# Patient Record
Sex: FEMALE | Race: OTHER | Employment: UNEMPLOYED | URBAN - METROPOLITAN AREA
[De-identification: names, ages, dates, MRNs, and addresses within clinical notes are randomized per-mention and may not be internally consistent; named-entity substitution may affect disease eponyms.]

---

## 2019-07-02 ENCOUNTER — HOSPITAL ENCOUNTER (EMERGENCY)
Age: 19
Discharge: HOME | End: 2019-07-02
Payer: COMMERCIAL

## 2019-07-02 VITALS
WEIGHT: 143.74 LBS | TEMPERATURE: 98.6 F | BODY MASS INDEX: 25.47 KG/M2 | RESPIRATION RATE: 18 BRPM | DIASTOLIC BLOOD PRESSURE: 67 MMHG | HEART RATE: 68 BPM | OXYGEN SATURATION: 98 % | HEIGHT: 63 IN | SYSTOLIC BLOOD PRESSURE: 102 MMHG

## 2019-07-02 DIAGNOSIS — G43.909 MIGRAINE WITHOUT STATUS MIGRAINOSUS, NOT INTRACTABLE, UNSPECIFIED MIGRAINE TYPE: Primary | ICD-10-CM

## 2019-07-02 PROCEDURE — 2580000003 HC RX 258: Performed by: PHYSICIAN ASSISTANT

## 2019-07-02 PROCEDURE — 96361 HYDRATE IV INFUSION ADD-ON: CPT

## 2019-07-02 PROCEDURE — 96375 TX/PRO/DX INJ NEW DRUG ADDON: CPT

## 2019-07-02 PROCEDURE — 99283 EMERGENCY DEPT VISIT LOW MDM: CPT

## 2019-07-02 PROCEDURE — 6360000002 HC RX W HCPCS: Performed by: PHYSICIAN ASSISTANT

## 2019-07-02 PROCEDURE — 96374 THER/PROPH/DIAG INJ IV PUSH: CPT

## 2019-07-02 RX ORDER — 0.9 % SODIUM CHLORIDE 0.9 %
1000 INTRAVENOUS SOLUTION INTRAVENOUS ONCE
Status: COMPLETED | OUTPATIENT
Start: 2019-07-02 | End: 2019-07-02

## 2019-07-02 RX ORDER — KETOROLAC TROMETHAMINE 30 MG/ML
30 INJECTION, SOLUTION INTRAMUSCULAR; INTRAVENOUS ONCE
Status: COMPLETED | OUTPATIENT
Start: 2019-07-02 | End: 2019-07-02

## 2019-07-02 RX ORDER — METOCLOPRAMIDE HYDROCHLORIDE 5 MG/ML
10 INJECTION INTRAMUSCULAR; INTRAVENOUS ONCE
Status: COMPLETED | OUTPATIENT
Start: 2019-07-02 | End: 2019-07-02

## 2019-07-02 RX ORDER — DIPHENHYDRAMINE HYDROCHLORIDE 50 MG/ML
12.5 INJECTION INTRAMUSCULAR; INTRAVENOUS ONCE
Status: COMPLETED | OUTPATIENT
Start: 2019-07-02 | End: 2019-07-02

## 2019-07-02 RX ADMIN — KETOROLAC TROMETHAMINE 30 MG: 30 INJECTION, SOLUTION INTRAMUSCULAR at 16:38

## 2019-07-02 RX ADMIN — METOCLOPRAMIDE 10 MG: 5 INJECTION, SOLUTION INTRAMUSCULAR; INTRAVENOUS at 16:38

## 2019-07-02 RX ADMIN — SODIUM CHLORIDE 1000 ML: 9 INJECTION, SOLUTION INTRAVENOUS at 16:38

## 2019-07-02 RX ADMIN — DIPHENHYDRAMINE HYDROCHLORIDE 12.5 MG: 50 INJECTION, SOLUTION INTRAMUSCULAR; INTRAVENOUS at 16:38

## 2019-07-02 ASSESSMENT — PAIN DESCRIPTION - LOCATION: LOCATION: HEAD

## 2019-07-02 ASSESSMENT — PAIN SCALES - GENERAL
PAINLEVEL_OUTOF10: 7
PAINLEVEL_OUTOF10: 7

## 2019-07-02 ASSESSMENT — PAIN DESCRIPTION - PAIN TYPE: TYPE: ACUTE PAIN

## 2019-07-02 ASSESSMENT — PAIN DESCRIPTION - PROGRESSION: CLINICAL_PROGRESSION: GRADUALLY WORSENING

## 2019-07-02 ASSESSMENT — PAIN DESCRIPTION - FREQUENCY: FREQUENCY: CONTINUOUS

## 2019-07-02 ASSESSMENT — ENCOUNTER SYMPTOMS
CHEST TIGHTNESS: 0
SHORTNESS OF BREATH: 0
COLOR CHANGE: 0
VOMITING: 0
NAUSEA: 0

## 2019-07-02 ASSESSMENT — PAIN DESCRIPTION - ONSET: ONSET: GRADUAL

## 2019-07-02 ASSESSMENT — PAIN DESCRIPTION - DESCRIPTORS: DESCRIPTORS: THROBBING

## 2019-07-02 NOTE — ED PROVIDER NOTES
and myalgias. Skin: Negative for color change, pallor and rash. Neurological: Positive for headaches. Negative for dizziness, weakness and light-headedness. Psychiatric/Behavioral: Negative for behavioral problems and confusion. Except as noted above the remainder of the review of systems was reviewed and negative. PAST MEDICAL HISTORY   History reviewed. No pertinent past medical history. SURGICAL HISTORY     History reviewed. No pertinent surgical history. CURRENT MEDICATIONS     [unfilled]    ALLERGIES     Ritalin [methylphenidate] and Oxycodone    FAMILY HISTORY     History reviewed. No pertinent family history. No family status information on file. SOCIAL HISTORY      reports that she has never smoked. She has never used smokeless tobacco. She reports that she does not drink alcohol or use drugs. PHYSICAL EXAM    (up to 7 for level 4, 8 or more for level 5)     ED Triage Vitals [07/02/19 1607]   Enc Vitals Group      /67      Pulse 68      Resp 18      Temp 98.6 °F (37 °C)      Temp Source Oral      SpO2 98 %      Weight 143 lb 11.8 oz (65.2 kg)      Height 5' 3\" (1.6 m)      Head Circumference       Peak Flow       Pain Score       Pain Loc       Pain Edu? Excl. in [address]? Physical Exam   Constitutional: She is oriented to person, place, and time. She appears well-developed and well-nourished. HENT:   Head: Normocephalic and atraumatic. Eyes: Conjunctivae and EOM are normal.   Neck: Normal range of motion. Neck supple. Pulmonary/Chest: Effort normal. No respiratory distress. Musculoskeletal: Normal range of motion. Neurological: She is alert and oriented to person, place, and time. No cranial nerve deficit. Coordination (Normal finger-to-nose) normal.   Sensation intact throughout   Skin: Skin is warm. She is not diaphoretic. No erythema. Psychiatric: She has a normal mood and affect.  Her behavior is normal.         DIAGNOSTIC RESULTS     EKG: Return to ED if acute worsening, change in headache or other concerning symptoms. Patient comfortable with plan. The patient tolerated their visit well. I saw the patient independently with physician available for consultation as needed. I have discussed the findings of today's workup with the patient and addressed the patient's questions and concerns. Important warning signs as well as new or worsening symptoms which would necessitate immediate return to the ED were discussed. The plan is to discharge from the ED at this time, and the patient is in stable condition. The patient acknowledged understanding is agreeable with this plan. CONSULTS:  None    PROCEDURES:  Procedures    FINAL IMPRESSION      1. Migraine without status migrainosus, not intractable, unspecified migraine type          DISPOSITION/PLAN   [unfilled]    PATIENT REFERRED TO:  [place] [address]  [phone]  Go to   If symptoms worsen    [facility] [address] [place] [address] [phone]    Schedule an appointment as soon as possible for a visit in 2 days  For follow up and reevaluation.       DISCHARGE MEDICATIONS:  New Prescriptions    No medications on file       (Please note that portions of this note were completed with a voice recognition program.  Efforts were made to edit the dictations but occasionally words are mis-transcribed.)    [address] [facility]          [address] [place], [place]  07/02/19 6009

## 2019-08-11 ENCOUNTER — APPOINTMENT (OUTPATIENT)
Dept: GENERAL RADIOLOGY | Age: 19
End: 2019-08-11
Payer: COMMERCIAL

## 2019-08-11 ENCOUNTER — HOSPITAL ENCOUNTER (EMERGENCY)
Age: 19
Discharge: LEFT BEFORE DISCHARGE | End: 2019-08-11
Attending: EMERGENCY MEDICINE
Payer: COMMERCIAL

## 2019-08-11 ENCOUNTER — APPOINTMENT (OUTPATIENT)
Dept: CT IMAGING | Age: 19
End: 2019-08-11
Payer: COMMERCIAL

## 2019-08-11 VITALS
RESPIRATION RATE: 15 BRPM | TEMPERATURE: 98.4 F | WEIGHT: 144.7 LBS | HEIGHT: 63 IN | DIASTOLIC BLOOD PRESSURE: 66 MMHG | BODY MASS INDEX: 25.64 KG/M2 | OXYGEN SATURATION: 98 % | HEART RATE: 74 BPM | SYSTOLIC BLOOD PRESSURE: 113 MMHG

## 2019-08-11 DIAGNOSIS — Z53.29 LEFT AGAINST MEDICAL ADVICE: Primary | ICD-10-CM

## 2019-08-11 LAB
A/G RATIO: 1.6 (ref 1.1–2.2)
ALBUMIN SERPL-MCNC: 4.5 G/DL (ref 3.4–5)
ALP BLD-CCNC: 93 U/L (ref 40–129)
ALT SERPL-CCNC: 13 U/L (ref 10–40)
ANION GAP SERPL CALCULATED.3IONS-SCNC: 12 MMOL/L (ref 3–16)
AST SERPL-CCNC: 13 U/L (ref 15–37)
BASOPHILS ABSOLUTE: 0.1 K/UL (ref 0–0.2)
BASOPHILS RELATIVE PERCENT: 1 %
BILIRUB SERPL-MCNC: 0.3 MG/DL (ref 0–1)
BUN BLDV-MCNC: 15 MG/DL (ref 7–20)
CALCIUM SERPL-MCNC: 10.3 MG/DL (ref 8.3–10.6)
CHLORIDE BLD-SCNC: 106 MMOL/L (ref 99–110)
CO2: 24 MMOL/L (ref 21–32)
CREAT SERPL-MCNC: 0.8 MG/DL (ref 0.6–1.1)
EOSINOPHILS ABSOLUTE: 0.2 K/UL (ref 0–0.6)
EOSINOPHILS RELATIVE PERCENT: 2.4 %
GFR AFRICAN AMERICAN: >60
GFR NON-AFRICAN AMERICAN: >60
GLOBULIN: 2.8 G/DL
GLUCOSE BLD-MCNC: 110 MG/DL (ref 70–99)
GONADOTROPIN, CHORIONIC (HCG) QUANT: <5 MIU/ML
HCT VFR BLD CALC: 40.3 % (ref 36–48)
HEMOGLOBIN: 13.4 G/DL (ref 12–16)
LYMPHOCYTES ABSOLUTE: 2 K/UL (ref 1–5.1)
LYMPHOCYTES RELATIVE PERCENT: 26.8 %
MCH RBC QN AUTO: 31.2 PG (ref 26–34)
MCHC RBC AUTO-ENTMCNC: 33.3 G/DL (ref 31–36)
MCV RBC AUTO: 93.8 FL (ref 80–100)
MONOCYTES ABSOLUTE: 0.5 K/UL (ref 0–1.3)
MONOCYTES RELATIVE PERCENT: 7.5 %
NEUTROPHILS ABSOLUTE: 4.6 K/UL (ref 1.7–7.7)
NEUTROPHILS RELATIVE PERCENT: 62.3 %
PDW BLD-RTO: 13.2 % (ref 12.4–15.4)
PLATELET # BLD: 252 K/UL (ref 135–450)
PMV BLD AUTO: 8.3 FL (ref 5–10.5)
POTASSIUM SERPL-SCNC: 3.9 MMOL/L (ref 3.5–5.1)
RBC # BLD: 4.3 M/UL (ref 4–5.2)
SODIUM BLD-SCNC: 142 MMOL/L (ref 136–145)
TOTAL PROTEIN: 7.3 G/DL (ref 6.4–8.2)
TROPONIN: <0.01 NG/ML
WBC # BLD: 7.3 K/UL (ref 4–11)

## 2019-08-11 PROCEDURE — 36415 COLL VENOUS BLD VENIPUNCTURE: CPT

## 2019-08-11 PROCEDURE — 84702 CHORIONIC GONADOTROPIN TEST: CPT

## 2019-08-11 PROCEDURE — 70450 CT HEAD/BRAIN W/O DYE: CPT

## 2019-08-11 PROCEDURE — 71046 X-RAY EXAM CHEST 2 VIEWS: CPT

## 2019-08-11 PROCEDURE — 85025 COMPLETE CBC W/AUTO DIFF WBC: CPT

## 2019-08-11 PROCEDURE — 93005 ELECTROCARDIOGRAM TRACING: CPT | Performed by: EMERGENCY MEDICINE

## 2019-08-11 PROCEDURE — 84484 ASSAY OF TROPONIN QUANT: CPT

## 2019-08-11 PROCEDURE — 6370000000 HC RX 637 (ALT 250 FOR IP): Performed by: EMERGENCY MEDICINE

## 2019-08-11 PROCEDURE — 99284 EMERGENCY DEPT VISIT MOD MDM: CPT

## 2019-08-11 PROCEDURE — 80053 COMPREHEN METABOLIC PANEL: CPT

## 2019-08-11 RX ORDER — BUTALBITAL, ACETAMINOPHEN AND CAFFEINE 50; 325; 40 MG/1; MG/1; MG/1
1 TABLET ORAL ONCE
Status: COMPLETED | OUTPATIENT
Start: 2019-08-11 | End: 2019-08-11

## 2019-08-11 RX ADMIN — BUTALBITAL, ACETAMINOPHEN, AND CAFFEINE 1 TABLET: 50; 325; 40 TABLET ORAL at 21:27

## 2019-08-11 ASSESSMENT — PAIN DESCRIPTION - PROGRESSION: CLINICAL_PROGRESSION: NOT CHANGED

## 2019-08-11 ASSESSMENT — PAIN SCALES - GENERAL
PAINLEVEL_OUTOF10: 7
PAINLEVEL_OUTOF10: 4
PAINLEVEL_OUTOF10: 7

## 2019-08-11 ASSESSMENT — PAIN DESCRIPTION - PAIN TYPE: TYPE: ACUTE PAIN

## 2019-08-11 ASSESSMENT — PAIN DESCRIPTION - LOCATION
LOCATION: HEAD
LOCATION: HEAD

## 2019-08-11 ASSESSMENT — PAIN DESCRIPTION - DESCRIPTORS
DESCRIPTORS: PRESSURE
DESCRIPTORS: ACHING

## 2019-08-11 ASSESSMENT — PAIN DESCRIPTION - ONSET: ONSET: ON-GOING

## 2019-08-11 ASSESSMENT — PAIN DESCRIPTION - FREQUENCY: FREQUENCY: CONTINUOUS

## 2019-08-11 ASSESSMENT — PAIN DESCRIPTION - ORIENTATION: ORIENTATION: MID

## 2019-08-12 LAB
EKG ATRIAL RATE: 82 BPM
EKG DIAGNOSIS: NORMAL
EKG P AXIS: 50 DEGREES
EKG P-R INTERVAL: 142 MS
EKG Q-T INTERVAL: 340 MS
EKG QRS DURATION: 100 MS
EKG QTC CALCULATION (BAZETT): 397 MS
EKG R AXIS: -3 DEGREES
EKG T AXIS: 27 DEGREES
EKG VENTRICULAR RATE: 82 BPM

## 2019-08-12 PROCEDURE — 93010 ELECTROCARDIOGRAM REPORT: CPT | Performed by: INTERNAL MEDICINE

## 2020-01-05 ENCOUNTER — HOSPITAL ENCOUNTER (EMERGENCY)
Age: 20
Discharge: HOME | End: 2020-01-05
Attending: EMERGENCY MEDICINE
Payer: COMMERCIAL

## 2020-01-05 VITALS
DIASTOLIC BLOOD PRESSURE: 71 MMHG | SYSTOLIC BLOOD PRESSURE: 115 MMHG | OXYGEN SATURATION: 99 % | WEIGHT: 132.94 LBS | TEMPERATURE: 97 F | RESPIRATION RATE: 17 BRPM | BODY MASS INDEX: 23.55 KG/M2 | HEART RATE: 65 BPM

## 2020-01-05 LAB
BACTERIA WET PREP: ABNORMAL
BACTERIA: ABNORMAL /HPF
BILIRUBIN URINE: NEGATIVE
BLOOD, URINE: ABNORMAL
CLARITY: ABNORMAL
CLUE CELLS: ABNORMAL
COLOR: YELLOW
EPITHELIAL CELLS WET PREP: ABNORMAL
EPITHELIAL CELLS, UA: 4 /HPF (ref 0–5)
GLUCOSE URINE: NEGATIVE MG/DL
HCG(URINE) PREGNANCY TEST: NEGATIVE
HYALINE CASTS: 11 /LPF (ref 0–8)
KETONES, URINE: NEGATIVE MG/DL
LEUKOCYTE ESTERASE, URINE: ABNORMAL
MICROSCOPIC EXAMINATION: YES
NITRITE, URINE: POSITIVE
PH UA: 7 (ref 5–8)
PROTEIN UA: ABNORMAL MG/DL
RBC UA: 18 /HPF (ref 0–4)
RBC WET PREP: ABNORMAL
SOURCE WET PREP: ABNORMAL
SPECIFIC GRAVITY UA: 1.02 (ref 1–1.03)
TRICHOMONAS PREP: ABNORMAL
URINE REFLEX TO CULTURE: YES
URINE TYPE: ABNORMAL
UROBILINOGEN, URINE: 0.2 E.U./DL
WBC UA: 109 /HPF (ref 0–5)
WBC WET PREP: ABNORMAL
YEAST WET PREP: ABNORMAL

## 2020-01-05 PROCEDURE — 87253 VIRUS INOCULATE TISSUE ADDL: CPT

## 2020-01-05 PROCEDURE — 87210 SMEAR WET MOUNT SALINE/INK: CPT

## 2020-01-05 PROCEDURE — 87252 VIRUS INOCULATION TISSUE: CPT

## 2020-01-05 PROCEDURE — 87077 CULTURE AEROBIC IDENTIFY: CPT

## 2020-01-05 PROCEDURE — 84703 CHORIONIC GONADOTROPIN ASSAY: CPT

## 2020-01-05 PROCEDURE — 87186 SC STD MICRODIL/AGAR DIL: CPT

## 2020-01-05 PROCEDURE — 87086 URINE CULTURE/COLONY COUNT: CPT

## 2020-01-05 PROCEDURE — 87591 N.GONORRHOEAE DNA AMP PROB: CPT

## 2020-01-05 PROCEDURE — 81001 URINALYSIS AUTO W/SCOPE: CPT

## 2020-01-05 PROCEDURE — 99283 EMERGENCY DEPT VISIT LOW MDM: CPT

## 2020-01-05 PROCEDURE — 87491 CHLMYD TRACH DNA AMP PROBE: CPT

## 2020-01-05 RX ORDER — METRONIDAZOLE 500 MG/1
500 TABLET ORAL 2 TIMES DAILY
Qty: 14 TABLET | Refills: 0 | Status: SHIPPED | OUTPATIENT
Start: 2020-01-05 | End: 2020-01-12

## 2020-01-05 RX ORDER — NITROFURANTOIN 25; 75 MG/1; MG/1
100 CAPSULE ORAL 2 TIMES DAILY
Qty: 14 CAPSULE | Refills: 0 | Status: SHIPPED | OUTPATIENT
Start: 2020-01-05 | End: 2020-01-12

## 2020-01-05 ASSESSMENT — ENCOUNTER SYMPTOMS
RESPIRATORY NEGATIVE: 1
GASTROINTESTINAL NEGATIVE: 1

## 2020-01-05 NOTE — ED NOTES
AVS reviewed with pt who verbalized understanding of f/u care and d/c instructions.  Pt stable for d/c     Honey Seaman RN  01/05/20 0992

## 2020-01-05 NOTE — ED TRIAGE NOTES
pt presents to ED for concern of STD or yeast infection. states she has had white discharge took an OTC medication.  also concerned for \"white dot\" in [place] area

## 2020-01-05 NOTE — ED PROVIDER NOTES
[facility]  eMERGENCY dEPARTMENT eNCOUnter    Pt Name: @@  MRN: [id number]  Birthdate2000  Date of evaluation: 1/5/2020  Provider: MACO Gorman CNP     eValuated by the advanced practice provider    CHIEF COMPLAINT       Chief Complaint   Patient presents with    Exposure to STD     pt presents to ED for concern of STD or yeast infection. states she has had white discharge took an OTC medication. also concerned for \"white dot\" in [place] area         HISTORY OF PRESENT ILLNESS  (Location/Symptom, Timing/Onset, Context/Setting, Quality, Duration, Modifying Factors, Severity.)   Nate Bhatti is a 22 y.o. female who presents to the emergencydepartment concerned about a sexually transmitted infection, noted a white vaginal discharge earlier this week. Treated herself with miconazole vaginal cream.  Discharge resolved. However she was concerned about 2 or 3 red canker sore looking bumps on her right labia. She states she treated it with with tea tree oil and it seems to be improving. This is the first time she is ever noticed that and her vaginal area. Nursing Notes were reviewed and I agree. REVIEW OF SYSTEMS    (2-9 systems for level 4, 10 or more for level 5)     Review of Systems   Constitutional: Negative. HENT: Negative. Respiratory: Negative. Cardiovascular: Negative. Gastrointestinal: Negative. Genitourinary: Positive for vaginal discharge. Negative for difficulty urinating, dyspareunia and dysuria. Musculoskeletal: Negative. Except as noted above the remainder of the review of systems was reviewed and negative. PAST MEDICAL HISTORY   History reviewed. No pertinent past medical history. SURGICAL HISTORY     History reviewed. No pertinent surgical history.     CURRENT MEDICATIONS       Discharge Medication List as of 1/5/2020  2:00 PM          ALLERGIES     Ritalin [methylphenidate] and Oxycodone    FAMILY HISTORY     History reviewed. No pertinent family history. No family status information on file. SOCIAL HISTORY      reports that she has never smoked. She has never used smokeless tobacco. She reports current drug use. Drug: Marijuana. She reports that she does not drink alcohol. PHYSICAL EXAM    (up to 7 for level 4, 8 or more for level 5)      ED Triage Vitals [01/05/20 1207]   BP Temp Temp Source Pulse Resp SpO2 Height Weight   109/72 97 °F (36.1 °C) Temporal 75 17 97 % -- 132 lb 15 oz (60.3 kg)       Physical Exam  Vitals signs and nursing note reviewed. Exam conducted with a chaperone present [facility] tech present). Cardiovascular:      Rate and Rhythm: Normal rate. Pulses: Normal pulses. Pulmonary:      Effort: Pulmonary effort is normal.   Abdominal:      Hernia: There is no hernia in the right inguinal area or left inguinal area. Genitourinary:     Exam position: Lithotomy position. Pubic Area: No pubic lice. Labia:         Right: Lesion present. Left: No lesion. Vagina: Vaginal discharge present. No tenderness. Cervix: No discharge. Adnexa: Right adnexa normal and left adnexa normal.          Comments: Yellowish vaginal discharge in the vaginal vault. Lymphadenopathy:      Lower Body: No right inguinal adenopathy. No left inguinal adenopathy. Skin:     General: Skin is warm and dry. Capillary Refill: Capillary refill takes less than 2 seconds. Neurological:      General: No focal deficit present. Mental Status: She is alert.            DIAGNOSTIC RESULTS     RADIOLOGY:   Non-plain film images such as CT, Ultrasound and MRI are read by the radiologist. Plain radiographic images are visualized and preliminarily interpreted by MACO Garcia CNP with the below findings:        Interpretation per the Radiologist below, if available at the time of this note:    No orders to display       LABS:  Labs Reviewed   WET PREP, above.    As positive for nitrites moderate leuk esterase. Pregnancy is negative. Microscopic urine: 4+ bacteria 109 white blood cells. Wet prep: Negative trichomonas negative yeast, positive clue cells  Gonorrhea and Chlamydia culture in process  Genital herpes culture in process    Patient is instructed [facility], abstain from any sexual relations, should be treated with Flagyl for BV and Macrobid for the UTI. I told the patient that if her GC cultures are positive or her herpes culture is positive she will be called and notified to return for treatment. Patient verbalized understanding and she is discharged home in good condition. This dictation was performed with a verbal recognition program (DRAGON) and it was checked for errors. It is possible that there are still dictated errors within this office note. If so, please bring any errors to my attention for an addendum. All efforts were made to ensure that this office note is accurate. PROCEDURES:  Procedures    FINAL IMPRESSION      1. Urinary tract infection without hematuria, site unspecified    2. Bacterial vaginosis          DISPOSITION/PLAN   DISPOSITION Decision To Discharge 01/05/2020 01:49:44 PM      PATIENT REFERRED TO:  No follow-up provider specified.     DISCHARGE MEDICATIONS:  Discharge Medication List as of 1/5/2020  2:00 PM      START taking these medications    Details   metroNIDAZOLE (FLAGYL) 500 MG tablet Take 1 tablet by mouth 2 times daily for 7 days, Disp-14 tablet, R-0Print      nitrofurantoin, macrocrystal-monohydrate, (MACROBID) 100 MG capsule Take 1 capsule by mouth 2 times daily for 7 days, Disp-14 capsule, R-0Print             (Please note that portions of this note werecompleted with a voice recognition program.  Efforts were made to edit the dictations but occasionally words are mis-transcribed.)    Treva Peres, APRN - CNP      Elver Padilla, APRN - CNP  01/05/20 5060

## 2020-01-07 LAB
ORGANISM: ABNORMAL
URINE CULTURE, ROUTINE: ABNORMAL

## 2020-01-08 LAB
C TRACH DNA GENITAL QL NAA+PROBE: NORMAL
FINAL REPORT: NORMAL
N. GONORRHOEAE DNA: NORMAL
PRELIMINARY: NORMAL

## 2020-01-11 NOTE — RESULT ENCOUNTER NOTE
Patient's positive result has been appropriately evaluated by the provider pool. Patient was contacted and notified of the change in treatment plan. Medication was phoned to the patient's pharmacy per protocol:    Pharmacy: No Pharmacies Listed  Phone number: [phone]  Pharmacist receiving the prescription: Gisselle  Spoke with her on the phone  She was very upset by the diagnosis. Given [facility] web site to get further information  Aware no sex until outbreak is gone and then only protected sex. To follow up with [facility].   To inform sex partners

## 2020-05-28 ENCOUNTER — APPOINTMENT (OUTPATIENT)
Dept: CT IMAGING | Age: 20
End: 2020-05-28
Payer: COMMERCIAL

## 2020-05-28 ENCOUNTER — HOSPITAL ENCOUNTER (EMERGENCY)
Age: 20
Discharge: HOME | End: 2020-05-29
Attending: EMERGENCY MEDICINE
Payer: COMMERCIAL

## 2020-05-28 LAB
ANION GAP SERPL CALCULATED.3IONS-SCNC: 9 MMOL/L (ref 3–16)
BACTERIA: ABNORMAL /HPF
BASOPHILS ABSOLUTE: 0.1 K/UL (ref 0–0.2)
BASOPHILS RELATIVE PERCENT: 0.7 %
BILIRUBIN URINE: NEGATIVE
BLOOD, URINE: ABNORMAL
BUN BLDV-MCNC: 13 MG/DL (ref 7–20)
CALCIUM SERPL-MCNC: 10.4 MG/DL (ref 8.3–10.6)
CHLORIDE BLD-SCNC: 109 MMOL/L (ref 99–110)
CLARITY: ABNORMAL
CO2: 24 MMOL/L (ref 21–32)
COLOR: ABNORMAL
CREAT SERPL-MCNC: 0.6 MG/DL (ref 0.6–1.1)
EOSINOPHILS ABSOLUTE: 0.2 K/UL (ref 0–0.6)
EOSINOPHILS RELATIVE PERCENT: 2.1 %
EPITHELIAL CELLS, UA: ABNORMAL /HPF (ref 0–5)
GFR AFRICAN AMERICAN: >60
GFR NON-AFRICAN AMERICAN: >60
GLUCOSE BLD-MCNC: 91 MG/DL (ref 70–99)
GLUCOSE URINE: NEGATIVE MG/DL
HCG(URINE) PREGNANCY TEST: NEGATIVE
HCT VFR BLD CALC: 39.9 % (ref 36–48)
HEMOGLOBIN: 13.5 G/DL (ref 12–16)
KETONES, URINE: NEGATIVE MG/DL
LEUKOCYTE ESTERASE, URINE: ABNORMAL
LYMPHOCYTES ABSOLUTE: 2.7 K/UL (ref 1–5.1)
LYMPHOCYTES RELATIVE PERCENT: 30.8 %
MCH RBC QN AUTO: 32.6 PG (ref 26–34)
MCHC RBC AUTO-ENTMCNC: 33.8 G/DL (ref 31–36)
MCV RBC AUTO: 96.5 FL (ref 80–100)
MICROSCOPIC EXAMINATION: YES
MONOCYTES ABSOLUTE: 0.6 K/UL (ref 0–1.3)
MONOCYTES RELATIVE PERCENT: 7.1 %
NEUTROPHILS ABSOLUTE: 5.3 K/UL (ref 1.7–7.7)
NEUTROPHILS RELATIVE PERCENT: 59.3 %
NITRITE, URINE: NEGATIVE
PDW BLD-RTO: 12.6 % (ref 12.4–15.4)
PH UA: 6 (ref 5–8)
PLATELET # BLD: 254 K/UL (ref 135–450)
PMV BLD AUTO: 8.6 FL (ref 5–10.5)
POTASSIUM REFLEX MAGNESIUM: 4 MMOL/L (ref 3.5–5.1)
PROTEIN UA: 30 MG/DL
RBC # BLD: 4.13 M/UL (ref 4–5.2)
RBC UA: >100 /HPF (ref 0–4)
SODIUM BLD-SCNC: 142 MMOL/L (ref 136–145)
SPECIFIC GRAVITY UA: >=1.03 (ref 1–1.03)
URINE REFLEX TO CULTURE: ABNORMAL
URINE TYPE: ABNORMAL
UROBILINOGEN, URINE: 0.2 E.U./DL
WBC # BLD: 8.9 K/UL (ref 4–11)
WBC UA: ABNORMAL /HPF (ref 0–5)

## 2020-05-28 PROCEDURE — 84703 CHORIONIC GONADOTROPIN ASSAY: CPT

## 2020-05-28 PROCEDURE — 99284 EMERGENCY DEPT VISIT MOD MDM: CPT

## 2020-05-28 PROCEDURE — 6360000002 HC RX W HCPCS: Performed by: EMERGENCY MEDICINE

## 2020-05-28 PROCEDURE — 96374 THER/PROPH/DIAG INJ IV PUSH: CPT

## 2020-05-28 PROCEDURE — 85025 COMPLETE CBC W/AUTO DIFF WBC: CPT

## 2020-05-28 PROCEDURE — 80048 BASIC METABOLIC PNL TOTAL CA: CPT

## 2020-05-28 PROCEDURE — 36415 COLL VENOUS BLD VENIPUNCTURE: CPT

## 2020-05-28 PROCEDURE — 81001 URINALYSIS AUTO W/SCOPE: CPT

## 2020-05-28 PROCEDURE — 74176 CT ABD & PELVIS W/O CONTRAST: CPT

## 2020-05-28 RX ORDER — KETOROLAC TROMETHAMINE 30 MG/ML
30 INJECTION, SOLUTION INTRAMUSCULAR; INTRAVENOUS ONCE
Status: COMPLETED | OUTPATIENT
Start: 2020-05-28 | End: 2020-05-28

## 2020-05-28 RX ADMIN — KETOROLAC TROMETHAMINE 30 MG: 30 INJECTION, SOLUTION INTRAMUSCULAR at 23:15

## 2020-05-28 ASSESSMENT — PAIN SCALES - GENERAL: PAINLEVEL_OUTOF10: 10

## 2020-05-28 ASSESSMENT — PAIN DESCRIPTION - LOCATION: LOCATION: ABDOMEN

## 2020-05-28 ASSESSMENT — PAIN DESCRIPTION - DESCRIPTORS: DESCRIPTORS: STABBING

## 2020-05-28 ASSESSMENT — PAIN DESCRIPTION - FREQUENCY: FREQUENCY: INTERMITTENT

## 2020-05-28 ASSESSMENT — PAIN DESCRIPTION - PAIN TYPE: TYPE: ACUTE PAIN

## 2020-05-28 ASSESSMENT — PAIN DESCRIPTION - ORIENTATION: ORIENTATION: RIGHT

## 2020-05-29 VITALS
WEIGHT: 139.55 LBS | TEMPERATURE: 98.1 F | HEIGHT: 63 IN | OXYGEN SATURATION: 99 % | BODY MASS INDEX: 24.73 KG/M2 | RESPIRATION RATE: 16 BRPM | SYSTOLIC BLOOD PRESSURE: 111 MMHG | HEART RATE: 62 BPM | DIASTOLIC BLOOD PRESSURE: 78 MMHG

## 2020-05-29 VITALS
HEART RATE: 76 BPM | OXYGEN SATURATION: 99 % | HEIGHT: 63 IN | BODY MASS INDEX: 24.41 KG/M2 | RESPIRATION RATE: 14 BRPM | WEIGHT: 137.79 LBS | SYSTOLIC BLOOD PRESSURE: 113 MMHG | TEMPERATURE: 98.4 F | DIASTOLIC BLOOD PRESSURE: 71 MMHG

## 2020-05-29 PROCEDURE — 2580000003 HC RX 258: Performed by: EMERGENCY MEDICINE

## 2020-05-29 PROCEDURE — 96374 THER/PROPH/DIAG INJ IV PUSH: CPT

## 2020-05-29 PROCEDURE — 6360000002 HC RX W HCPCS: Performed by: EMERGENCY MEDICINE

## 2020-05-29 PROCEDURE — 6370000000 HC RX 637 (ALT 250 FOR IP): Performed by: EMERGENCY MEDICINE

## 2020-05-29 PROCEDURE — 99284 EMERGENCY DEPT VISIT MOD MDM: CPT

## 2020-05-29 RX ORDER — ONDANSETRON 2 MG/ML
4 INJECTION INTRAMUSCULAR; INTRAVENOUS ONCE
Status: COMPLETED | OUTPATIENT
Start: 2020-05-29 | End: 2020-05-29

## 2020-05-29 RX ORDER — MORPHINE SULFATE 4 MG/ML
4 INJECTION, SOLUTION INTRAMUSCULAR; INTRAVENOUS ONCE
Status: COMPLETED | OUTPATIENT
Start: 2020-05-29 | End: 2020-05-29

## 2020-05-29 RX ORDER — ONDANSETRON 4 MG/1
4 TABLET, ORALLY DISINTEGRATING ORAL ONCE
Status: COMPLETED | OUTPATIENT
Start: 2020-05-29 | End: 2020-05-29

## 2020-05-29 RX ORDER — 0.9 % SODIUM CHLORIDE 0.9 %
500 INTRAVENOUS SOLUTION INTRAVENOUS ONCE
Status: COMPLETED | OUTPATIENT
Start: 2020-05-29 | End: 2020-05-29

## 2020-05-29 RX ORDER — HYDROCODONE BITARTRATE AND ACETAMINOPHEN 5; 325 MG/1; MG/1
1 TABLET ORAL EVERY 6 HOURS PRN
Qty: 12 TABLET | Refills: 0 | Status: SHIPPED | OUTPATIENT
Start: 2020-05-29 | End: 2020-06-01

## 2020-05-29 RX ORDER — TAMSULOSIN HYDROCHLORIDE 0.4 MG/1
0.4 CAPSULE ORAL DAILY
Qty: 5 CAPSULE | Refills: 0 | Status: SHIPPED | OUTPATIENT
Start: 2020-05-29 | End: 2020-12-04

## 2020-05-29 RX ORDER — ONDANSETRON 4 MG/1
4 TABLET, ORALLY DISINTEGRATING ORAL EVERY 8 HOURS PRN
Qty: 20 TABLET | Refills: 0 | Status: SHIPPED | OUTPATIENT
Start: 2020-05-29 | End: 2020-12-04

## 2020-05-29 RX ORDER — KETOROLAC TROMETHAMINE 30 MG/ML
30 INJECTION, SOLUTION INTRAMUSCULAR; INTRAVENOUS ONCE
Status: COMPLETED | OUTPATIENT
Start: 2020-05-29 | End: 2020-05-29

## 2020-05-29 RX ADMIN — MORPHINE SULFATE 4 MG: 4 INJECTION, SOLUTION INTRAMUSCULAR; INTRAVENOUS at 10:48

## 2020-05-29 RX ADMIN — SODIUM CHLORIDE 500 ML: 9 INJECTION, SOLUTION INTRAVENOUS at 11:00

## 2020-05-29 RX ADMIN — ONDANSETRON 4 MG: 4 TABLET, ORALLY DISINTEGRATING ORAL at 10:04

## 2020-05-29 RX ADMIN — ONDANSETRON 4 MG: 2 INJECTION INTRAMUSCULAR; INTRAVENOUS at 10:49

## 2020-05-29 RX ADMIN — KETOROLAC TROMETHAMINE 30 MG: 30 INJECTION, SOLUTION INTRAMUSCULAR at 10:04

## 2020-05-29 ASSESSMENT — PAIN SCALES - GENERAL
PAINLEVEL_OUTOF10: 10
PAINLEVEL_OUTOF10: 8
PAINLEVEL_OUTOF10: 2
PAINLEVEL_OUTOF10: 10
PAINLEVEL_OUTOF10: 8
PAINLEVEL_OUTOF10: 10

## 2020-05-29 ASSESSMENT — PAIN DESCRIPTION - PAIN TYPE: TYPE: ACUTE PAIN

## 2020-05-29 ASSESSMENT — PAIN DESCRIPTION - ORIENTATION: ORIENTATION: RIGHT

## 2020-05-29 ASSESSMENT — PAIN DESCRIPTION - FREQUENCY: FREQUENCY: CONTINUOUS

## 2020-05-29 ASSESSMENT — PAIN DESCRIPTION - LOCATION: LOCATION: FLANK

## 2020-05-29 ASSESSMENT — PAIN DESCRIPTION - DESCRIPTORS: DESCRIPTORS: ACHING;SHARP

## 2020-05-29 NOTE — ED NOTES
Pt presents to the ED with c/o abdominal pain radiating to right flank x 2 days.       Ivone Casas RN  05/29/20 6756

## 2020-05-29 NOTE — ED PROVIDER NOTES
eMERGENCY dEPARTMENT eNCOUnter      Pt Name: Bella Bueno  MRN: [id number]  [place] 2000  Date of evaluation: 5/28/2020  Provider: Chuyita Newton MD     [address] [place] [address]       Chief Complaint   Patient presents with    Abdominal Pain     x1day, R abd pain, radiates to R flank, intermittant, \"Stabbing\" 9/10. HISTORY OF PRESENT ILLNESS   (Location/Symptom, Timing/Onset,Context/Setting, Quality, Duration, Modifying Factors, Severity) Note limiting factors. HPI    Bella Bueno is a 22 y.o. female who presents to the emergency department with right flank pain which radiates to the right lower quadrant for about 3 days. Patient states it has been worse today. Patient has some nausea and vomiting today. Patient states the pain is intermittent and currently does not have pain. Initially it was a 9 out of 10 pain level described as stabbing sensation. Patient has no prior history of kidney stones. There has been some dark urine but denies any hematuria. Patient states she is having periods. But she gets birth control injections. Has no fever. Nursing Notes were reviewed. REVIEW OFSYSTEMS    (2+ for level 4; 10+ for level 5)   Review of Systems    General: No fevers, chills or night sweats, No weight loss    Head:  No Sore throat,  No Ear Pain    Chest:  Nontender. No Cough, No SOB,  Chest Pain    GI: No abdominal pain or vomiting    : No dysuria or hematuria . No flank pain. Musculoskeletal: No unrelenting pain or night pain    Neurologic: No bowel or bladder incontinence, No saddle anesthesia, No leg weakness    All other systems reviewed and are negative. PAST MEDICAL HISTORY     Past Medical History:   Diagnosis Date    ESBL (extended spectrum beta-lactamase) producing bacteria infection 01/05/2020    urine       SURGICAL HISTORY     No past surgical history on file.     CURRENT MEDICATIONS       Previous Medications    No medications on file       ALLERGIES     Ritalin [methylphenidate] and Oxycodone    FAMILY HISTORY     No family history on file. SOCIAL HISTORY       Social History     Socioeconomic History    Marital status: Single     Spouse name: Not on file    Number of children: Not on file    Years of education: Not on file    Highest education level: Not on file   Occupational History    Not on file   Social Needs    Financial resource strain: Not on file    Food insecurity     Worry: Not on file     Inability: Not on file    Transportation needs     Medical: Not on file     Non-medical: Not on file   Tobacco Use    Smoking status: Never Smoker    Smokeless tobacco: Never Used   Substance and Sexual Activity    Alcohol use: No    Drug use: Yes     Types: Marijuana    Sexual activity: Not on file   Lifestyle    Physical activity     Days per week: Not on file     Minutes per session: Not on file    Stress: Not on file   Relationships    Social connections     Talks on phone: Not on file     Gets together: Not on file     Attends Yarsanism service: Not on file     Active member of club or organization: Not on file     Attends meetings of clubs or organizations: Not on file     Relationship status: Not on file    Intimate partner violence     Fear of current or ex partner: Not on file     Emotionally abused: Not on file     Physically abused: Not on file     Forced sexual activity: Not on file   Other Topics Concern    Not on file   Social History Narrative    Not on file       SCREENINGS           PHYSICAL EXAM    (up to 7 for level 4, 8 or more for level 5)     ED Triage Vitals [05/28/20 2251]   BP Temp Temp Source Pulse Resp SpO2 Height Weight   116/89 98.5 °F (36.9 °C) Oral 58 14 98 % 5' 3\" (1.6 m) 139 lb 8.8 oz (63.3 kg)       Physical Exam    General: Alert and awake ×3. Nontoxic appearance. Well-developed well-nourished  64-year-old female no distress. HEENT: Normocephalic atraumatic. Neck is supple. Airway intact.   No

## 2020-05-29 NOTE — ED PROVIDER NOTES
connections     Talks on phone: Not on file     Gets together: Not on file     Attends [employer] service: Not on file     Active member of club or organization: Not on file     Attends meetings of clubs or organizations: Not on file     Relationship status: Not on file    Intimate partner violence     Fear of current or ex partner: Not on file     Emotionally abused: Not on file     Physically abused: Not on file     Forced sexual activity: Not on file   Other Topics Concern    Not on file   Social History Narrative    Not on file     No current facility-administered medications for this encounter. Current Outpatient Medications   Medication Sig Dispense Refill    tamsulosin (FLOMAX) 0.4 MG capsule Take 1 capsule by mouth daily for 5 doses 5 capsule 0    HYDROcodone-acetaminophen (NORCO) 5-325 MG per tablet Take 1 tablet by mouth every 6 hours as needed for Pain for up to 3 days. Intended supply: 3 days.  Take lowest dose possible to manage pain 12 tablet 0    ondansetron (ZOFRAN ODT) 4 MG disintegrating tablet Take 1 tablet by mouth every 8 hours as needed for Nausea 20 tablet 0     Allergies   Allergen Reactions    Ritalin [Methylphenidate] Shortness Of Breath    Oxycodone      Difficulty breathing and itching  Difficulty breathing and itching           REVIEW OF SYSTEMS  10 systems reviewed, pertinent positives per HPI otherwise noted to be negative    PHYSICAL EXAM  /80   Pulse 78   Temp 98.5 °F (36.9 °C) (Oral)   Resp 14   Ht 5' 3\" (1.6 m)   Wt 137 lb 12.6 oz (62.5 kg)   LMP  (LMP Unknown)   SpO2 98%   BMI 24.41 kg/m²      CONSTITUTIONAL: AOx4, appears uncomfortable, cooperative with exam, afebrile   HEAD: normocephalic, atraumatic   EYES: PERRL, EOMI, anicteric sclera   ENT: Moist mucous membranes, uvula midline   BACK: Symmetric, no deformity, right CVA tenderness, no left CVA tenderness   LUNGS: Bilateral breath sounds, CTAB, no rales/ronchi/wheezes   CARDIOVASCULAR: RRR, normal S1/S2, no m/r/g, 2+ pulses throughout   ABDOMEN: Soft, non-tender, non-distended, +BS   NEUROLOGIC:  MAEx4, GCS 15   MUSCULOSKELETAL: No clubbing, cyanosis or edema   SKIN: No rash, pallor or wounds on exposed surfaces         RADIOLOGY  X-RAYS:  I have reviewed radiologic plain film image(s). ALL OTHER NON-PLAIN FILM IMAGES SUCH AS CT, ULTRASOUND AND MRI HAVE BEEN READ BY THE RADIOLOGIST. No orders to display          EKG INTERPRETATION  None    PROCEDURES    ED COURSE/MDM  Nephrolithiasis, flank pain    Patient seen and evaluated. History and physical as above. Patient was diagnosed with punctate nephrolithiasis on the right yesterday on CT scan. Patient's lab work and imaging from yesterday was reviewed. Urinalysis negative for infection, urine pregnancy negative, normal creatinine and white count. Patient does appear uncomfortable and does have right CVA tenderness. Will treat with IM Toradol and Zofran. Discussed starting patient on a course of Flomax as well as Zofran for nausea and short course of Norco for pain control. Patient agreeable. I do not feel that patient needs another work-up today as she just had full work-up yesterday less than 12 hours ago. ED Course as of May 29 1134   Fri May 29, 2020   1034 Patient reassessed. Patient kneeling on the bed hunched over complaining of pain. States she threw up after the oral Zofran and the Toradol IM injection did not provide relief from her pain. Will provide single dose of IV morphine with IVs Zofran.     [DS]   4142 Patient reassessed. States she is feeling better after the pain medication but is still having some pain. Discussed with patient that she will likely have pain until she passes her stones. States she does feel as though the pain is migrating towards her groin. Discussed that this is a good sign. Will discharge with short course of Norco, Zofran and Flomax with outpatient follow-up to urology.   Patient agreeable with care

## 2020-06-08 ENCOUNTER — HOSPITAL ENCOUNTER (EMERGENCY)
Age: 20
Discharge: HOME | End: 2020-06-08
Attending: EMERGENCY MEDICINE
Payer: COMMERCIAL

## 2020-06-08 VITALS
HEART RATE: 60 BPM | HEIGHT: 63 IN | BODY MASS INDEX: 23.83 KG/M2 | DIASTOLIC BLOOD PRESSURE: 76 MMHG | SYSTOLIC BLOOD PRESSURE: 119 MMHG | WEIGHT: 134.48 LBS | RESPIRATION RATE: 14 BRPM | TEMPERATURE: 98.4 F | OXYGEN SATURATION: 98 %

## 2020-06-08 LAB
BILIRUBIN URINE: NEGATIVE
BLOOD, URINE: ABNORMAL
CLARITY: ABNORMAL
COLOR: ABNORMAL
EPITHELIAL CELLS, UA: ABNORMAL /HPF (ref 0–5)
GLUCOSE URINE: NEGATIVE MG/DL
HCG(URINE) PREGNANCY TEST: NEGATIVE
KETONES, URINE: NEGATIVE MG/DL
LEUKOCYTE ESTERASE, URINE: ABNORMAL
MICROSCOPIC EXAMINATION: YES
NITRITE, URINE: NEGATIVE
PH UA: 6 (ref 5–8)
PROTEIN UA: 100 MG/DL
RBC UA: ABNORMAL /HPF (ref 0–4)
SPECIFIC GRAVITY UA: >=1.03 (ref 1–1.03)
URINE REFLEX TO CULTURE: YES
URINE TYPE: ABNORMAL
UROBILINOGEN, URINE: 0.2 E.U./DL
WBC UA: >100 /HPF (ref 0–5)

## 2020-06-08 PROCEDURE — 87077 CULTURE AEROBIC IDENTIFY: CPT

## 2020-06-08 PROCEDURE — 6370000000 HC RX 637 (ALT 250 FOR IP): Performed by: EMERGENCY MEDICINE

## 2020-06-08 PROCEDURE — 87086 URINE CULTURE/COLONY COUNT: CPT

## 2020-06-08 PROCEDURE — 87186 SC STD MICRODIL/AGAR DIL: CPT

## 2020-06-08 PROCEDURE — 81001 URINALYSIS AUTO W/SCOPE: CPT

## 2020-06-08 PROCEDURE — 99283 EMERGENCY DEPT VISIT LOW MDM: CPT

## 2020-06-08 PROCEDURE — 84703 CHORIONIC GONADOTROPIN ASSAY: CPT

## 2020-06-08 RX ORDER — IBUPROFEN 600 MG/1
600 TABLET ORAL ONCE
Status: DISCONTINUED | OUTPATIENT
Start: 2020-06-08 | End: 2020-06-08 | Stop reason: HOSPADM

## 2020-06-08 RX ORDER — NITROFURANTOIN 25; 75 MG/1; MG/1
100 CAPSULE ORAL 2 TIMES DAILY
Qty: 10 CAPSULE | Refills: 0 | Status: SHIPPED | OUTPATIENT
Start: 2020-06-08 | End: 2020-06-13

## 2020-06-08 RX ORDER — PHENAZOPYRIDINE HYDROCHLORIDE 200 MG/1
200 TABLET, FILM COATED ORAL 3 TIMES DAILY PRN
Qty: 6 TABLET | Refills: 0 | Status: SHIPPED | OUTPATIENT
Start: 2020-06-08 | End: 2020-06-11

## 2020-06-08 RX ADMIN — IBUPROFEN 600 MG: 600 TABLET, FILM COATED ORAL at 09:30

## 2020-06-08 ASSESSMENT — ENCOUNTER SYMPTOMS
VOMITING: 0
ABDOMINAL PAIN: 0
NAUSEA: 0
RESPIRATORY NEGATIVE: 1
CONSTIPATION: 0
SHORTNESS OF BREATH: 0
BACK PAIN: 0
EYES NEGATIVE: 1
COUGH: 0
GASTROINTESTINAL NEGATIVE: 1
DIARRHEA: 0

## 2020-06-08 ASSESSMENT — PAIN SCALES - GENERAL
PAINLEVEL_OUTOF10: 4
PAINLEVEL_OUTOF10: 4

## 2020-06-08 ASSESSMENT — PAIN DESCRIPTION - PAIN TYPE
TYPE: ACUTE PAIN
TYPE: ACUTE PAIN

## 2020-06-08 ASSESSMENT — PAIN DESCRIPTION - DESCRIPTORS: DESCRIPTORS: ACHING;THROBBING

## 2020-06-08 ASSESSMENT — PAIN DESCRIPTION - LOCATION: LOCATION: ABDOMEN

## 2020-06-08 NOTE — ED PROVIDER NOTES
spectrum beta-lactamase) producing bacteria infection 01/05/2020    urine    Kidney stone          SURGICAL HISTORY   History reviewed. No pertinent surgical history. [address]       Discharge Medication List as of 6/8/2020  9:27 AM      CONTINUE these medications which have NOT CHANGED    Details   tamsulosin (FLOMAX) 0.4 MG capsule Take 1 capsule by mouth daily for 5 doses, Disp-5 capsule, R-0Print      ondansetron (ZOFRAN ODT) 4 MG disintegrating tablet Take 1 tablet by mouth every 8 hours as needed for Nausea, Disp-20 tablet, R-0Print             ALLERGIES     Ritalin [methylphenidate] and Oxycodone    FAMILY HISTORY     History reviewed. No pertinent family history.        SOCIAL HISTORY       Social History     Socioeconomic History    Marital status: Single     Spouse name: None    Number of children: None    Years of education: None    Highest education level: None   Occupational History    None   Social Needs    Financial resource strain: None    Food insecurity     Worry: None     Inability: None    Transportation needs     Medical: None     Non-medical: None   Tobacco Use    Smoking status: Never Smoker    Smokeless tobacco: Never Used   Substance and Sexual Activity    Alcohol use: No    Drug use: Yes     Types: Marijuana    Sexual activity: None   Lifestyle    Physical activity     Days per week: None     Minutes per session: None    Stress: None   Relationships    Social connections     Talks on phone: None     Gets together: None     Attends Mu-ism service: None     Active member of club or organization: None     Attends meetings of clubs or organizations: None     Relationship status: None    Intimate partner violence     Fear of current or ex partner: None     Emotionally abused: None     Physically abused: None     Forced sexual activity: None   Other Topics Concern    None   Social History Narrative    None       SCREENINGS             PHYSICAL EXAM    (up to 7 WBC, UA >100 (A) 0 - 5 /HPF    RBC, UA see below (A) 0 - 4 /HPF    Epithelial Cells, UA 0-1 0 - 5 /HPF       All other labs were within normal range or not returned as of this dictation. EKG: All EKG's are interpreted by the Emergency Department Physician who either signs or co-signs this chart in the absence of a cardiologist.      RADIOLOGY:   plain film images such as CT, Ultrasound and MRI are read by the radiologist. Plain radiographic images are visualized and preliminarily interpreted by the ED Provider with the below findings:        PROCEDURES   Unless otherwise noted below, none     Procedures    CRITICAL CARE TIME   N/A    CONSULTS:  None    EMERGENCY DEPARTMENT COURSE and DIFFERENTIAL DIAGNOSIS/MDM:   Vitals:    Vitals:    06/08/20 0839   BP: 119/76   Pulse: 60   Resp: 14   Temp: 98.4 °F (36.9 °C)   TempSrc: Oral   SpO2: 98%   Weight: 134 lb 7.7 oz (61 kg)   Height: 5' 3\" (1.6 m)       Patient was given the following medications:  Medications   ibuprofen (ADVIL;MOTRIN) tablet 600 mg (has no administration in time range)       ED Course as of Jun 08 1059   Mon Jun 08, 2020   0902 RBC, UA(!): see below [BM]      ED Course User Index  [BM] Grayson Moncada MD       22-year-old female with recent kidney stone, now with resolution of her right flank pain and development of urinary frequency and dysuria. History of ESBL UTI. Symptoms seem consistent with cystitis most likely. Do not feel that she requires additional imaging at this time as it seems that her symptoms of obstructing uropathy have resolved, no systemic symptoms so would like to avoid additional radiation. Urinalysis shows moderate loose leukocyte esterase, greater than 100 WBCs, likely RBCs but obscured view per lab report. Feel that this most likely represents UTI, cystitis.   As she is clinically stable, no flank or abdominal pain, mainly bladder discomfort and dysuria, had discussion on risks and benefits of discharge versus admission or

## 2020-06-08 NOTE — LETTER
[place] [address]  Phone: [phone]               June 8, 2020    Patient: Jean-Claude Truong   YOB: 2000   Date of Visit: 6/8/2020       To Whom It May Concern:    Jean-Claude Truong was seen and treated in our emergency department on 6/8/2020. She may return to work on June 10th. Off work June 8th and 9th.       Sincerely,       Paula Atwood RN         Signature:__________________________________

## 2020-06-08 NOTE — ED NOTES
dc'd to home  Awake alert  Skin warm and dry  Aware to increase fluids  Aware to drink cranberry juice  To follow up with pmd to make sure urine clears after kidney stones and UTI     Guadalupe Lobo, RN  06/08/20 1514

## 2020-06-10 LAB
ORGANISM: ABNORMAL
URINE CULTURE, ROUTINE: ABNORMAL

## 2020-12-04 ENCOUNTER — HOSPITAL ENCOUNTER (EMERGENCY)
Age: 20
Discharge: HOME | End: 2020-12-04
Attending: EMERGENCY MEDICINE
Payer: COMMERCIAL

## 2020-12-04 VITALS
RESPIRATION RATE: 18 BRPM | BODY MASS INDEX: 26.25 KG/M2 | HEART RATE: 78 BPM | SYSTOLIC BLOOD PRESSURE: 118 MMHG | HEIGHT: 63 IN | DIASTOLIC BLOOD PRESSURE: 85 MMHG | TEMPERATURE: 98.5 F | OXYGEN SATURATION: 99 % | WEIGHT: 148.15 LBS

## 2020-12-04 LAB
AMORPHOUS: ABNORMAL /HPF
BILIRUBIN URINE: NEGATIVE
BLOOD, URINE: ABNORMAL
CLARITY: CLEAR
COLOR: YELLOW
EPITHELIAL CELLS, UA: ABNORMAL /HPF (ref 0–5)
GLUCOSE URINE: NEGATIVE MG/DL
HCG(URINE) PREGNANCY TEST: NEGATIVE
KETONES, URINE: NEGATIVE MG/DL
LEUKOCYTE ESTERASE, URINE: NEGATIVE
MICROSCOPIC EXAMINATION: YES
MUCUS: ABNORMAL /LPF
NITRITE, URINE: NEGATIVE
PH UA: 6 (ref 5–8)
PROTEIN UA: ABNORMAL MG/DL
RBC UA: ABNORMAL /HPF (ref 0–4)
SPECIFIC GRAVITY UA: >=1.03 (ref 1–1.03)
URINE TYPE: ABNORMAL
UROBILINOGEN, URINE: 0.2 E.U./DL
WBC UA: ABNORMAL /HPF (ref 0–5)

## 2020-12-04 PROCEDURE — U0003 INFECTIOUS AGENT DETECTION BY NUCLEIC ACID (DNA OR RNA); SEVERE ACUTE RESPIRATORY SYNDROME CORONAVIRUS 2 (SARS-COV-2) (CORONAVIRUS DISEASE [COVID-19]), AMPLIFIED PROBE TECHNIQUE, MAKING USE OF HIGH THROUGHPUT TECHNOLOGIES AS DESCRIBED BY CMS-2020-01-R: HCPCS

## 2020-12-04 PROCEDURE — 84703 CHORIONIC GONADOTROPIN ASSAY: CPT

## 2020-12-04 PROCEDURE — 99284 EMERGENCY DEPT VISIT MOD MDM: CPT

## 2020-12-04 PROCEDURE — 81001 URINALYSIS AUTO W/SCOPE: CPT

## 2020-12-04 PROCEDURE — U0002 COVID-19 LAB TEST NON-CDC: HCPCS

## 2020-12-04 ASSESSMENT — PAIN - FUNCTIONAL ASSESSMENT: PAIN_FUNCTIONAL_ASSESSMENT: 0-10

## 2020-12-04 ASSESSMENT — PAIN DESCRIPTION - ORIENTATION
ORIENTATION: RIGHT;MID;LOWER
ORIENTATION: LOWER;MID;RIGHT
ORIENTATION: RIGHT;LOWER;MID

## 2020-12-04 ASSESSMENT — PAIN DESCRIPTION - PAIN TYPE
TYPE: ACUTE PAIN

## 2020-12-04 ASSESSMENT — PAIN SCALES - GENERAL
PAINLEVEL_OUTOF10: 10
PAINLEVEL_OUTOF10: 5
PAINLEVEL_OUTOF10: 6

## 2020-12-04 ASSESSMENT — PAIN DESCRIPTION - LOCATION
LOCATION: BACK

## 2020-12-04 ASSESSMENT — PAIN DESCRIPTION - DESCRIPTORS: DESCRIPTORS: PRESSURE

## 2020-12-04 NOTE — ED NOTES
Walked pt from [facility] to ED bed. Obtained VS. Pt wearing mask, medic wearing mask, gloves, safety glasses.      Keerthi Guidry, EMT-P  12/04/20 7944

## 2020-12-04 NOTE — ED PROVIDER NOTES
Emergency Department provider note:    CHIEF COMPLAINT  Back Pain (lower back pain 10/10 x2wks) and Headache (8/10 x 2wks. pt also reports loss of taste and smell x1day.)      HISTORY OF PRESENT ILLNESS  Ame Fenton is a 23 y.o. female who presents to the ED with back pain for last 2 weeks, and today she has had headache and loss her sense of taste and smell. The patient states that her back's been hurting on the right side worse with movements, she gets nauseated in the mornings, she took a pregnancy test and it was negative today. But was concerned. She denies any numbness, tingling or weakness in the arms or the legs. She has had no saddle anesthesia or fever. Today she has had a cough, lost her taste and smell has a headache. She is not short of breath and does not have chest pain. No other complaints, modifying factors or associated symptoms. Nursing notes reviewed. Past Medical History:   Diagnosis Date    ESBL (extended spectrum beta-lactamase) producing bacteria infection 01/05/2020    urine    Kidney stone      History reviewed. No pertinent surgical history. History reviewed. No pertinent family history.   Social History     Socioeconomic History    Marital status: Single     Spouse name: Not on file    Number of children: Not on file    Years of education: Not on file    Highest education level: Not on file   Occupational History    Not on file   Social Needs    Financial resource strain: Not on file    Food insecurity     Worry: Not on file     Inability: Not on file    Transportation needs     Medical: Not on file     Non-medical: Not on file   Tobacco Use    Smoking status: Never Smoker    Smokeless tobacco: Never Used   Substance and Sexual Activity    Alcohol use: No    Drug use: Yes     Types: Marijuana    Sexual activity: Not on file   Lifestyle    Physical activity     Days per week: Not on file     Minutes per session: Not on file    Stress: Not on file Relationships    Social connections     Talks on phone: Not on file     Gets together: Not on file     Attends Zoroastrianism service: Not on file     Active member of club or organization: Not on file     Attends meetings of clubs or organizations: Not on file     Relationship status: Not on file    Intimate partner violence     Fear of current or ex partner: Not on file     Emotionally abused: Not on file     Physically abused: Not on file     Forced sexual activity: Not on file   Other Topics Concern    Not on file   Social History Narrative    Not on file     No current facility-administered medications for this encounter. No current outpatient medications on file. Allergies   Allergen Reactions    Ritalin [Methylphenidate] Shortness Of Breath    Oxycodone      Difficulty breathing and itching  Difficulty breathing and itching         REVIEW OF SYSTEMS  10 systems reviewed, pertinent positives per HPI otherwise noted to be negative    PHYSICAL EXAM  /85   Pulse 78   Temp 98.5 °F (36.9 °C) (Oral)   Resp 18   Ht 5' 3\" (1.6 m)   Wt 148 lb 2.4 oz (67.2 kg)   LMP 11/04/2020   SpO2 99%   BMI 26.24 kg/m²   GENERAL APPEARANCE: Awake and alert. Cooperative. No acute distress. HEAD: Normocephalic. Atraumatic. EYES: PERRL. EOM's grossly intact. ENT: Mucous membranes are moist.  No pharyngeal erythema or exudate  NECK: Supple. Normal ROM. No adenopathy  CHEST: Equal symmetric chest rise. Heart regular rate and rhythm with no murmurs  LUNGS: Breathing is unlabored. Speaking comfortably in full sentences. Lungs clear bilaterally. ABDOMEN: Nondistended. Nontender throughout  EXTREMITIES: MAEE. No acute deformities. Tender over the right paravertebral lumbar area without bruising or swelling. 2+ DTRs in patellas bilaterally. SKIN: Warm and dry. NEUROLOGICAL: Alert and oriented. Strength is 5/5 in all extremities and sensation is intact.     LABS  Results for orders placed or performed during the hospital encounter of 12/04/20   Pregnancy, Urine   Result Value Ref Range    HCG(Urine) Pregnancy Test Negative Detects HCG level >20 MIU/mL   Urinalysis, reflex to microscopic   Result Value Ref Range    Color, UA Yellow Straw/Yellow    Clarity, UA Clear Clear    Glucose, Ur Negative Negative mg/dL    Bilirubin Urine Negative Negative    Ketones, Urine Negative Negative mg/dL    Specific Gravity, UA >=1.030 1.005 - 1.030    Blood, Urine TRACE-INTACT (A) Negative    pH, UA 6.0 5.0 - 8.0    Protein, UA TRACE (A) Negative mg/dL    Urobilinogen, Urine 0.2 <2.0 E.U./dL    Nitrite, Urine Negative Negative    Leukocyte Esterase, Urine Negative Negative    Microscopic Examination YES     Urine Type NotGiven    Microscopic Urinalysis   Result Value Ref Range    Mucus, UA 1+ (A) None Seen /LPF    WBC, UA 0-2 0 - 5 /HPF    RBC, UA 3-4 0 - 4 /HPF    Epithelial Cells, UA 0-1 0 - 5 /HPF    Amorphous, UA 2+ /HPF       RADIOLOGY  No orders to display         ED COURSE/MDM  Patient seen and evaluated. Patient came in for back pain, no concerns for spinal epidural abscess discitis or cauda equina syndrome. Will treat with NSAIDs. Today she lost her taste, smell developed cough with a headache. Concern is for Covid, sent off a test asked patient to quarantine until testing is back and then follow CDC recommendations. Patient was given scripts for the following medications. I counseled patient how to take these medications. New Prescriptions    No medications on file           CLINICAL IMPRESSION  1. Strain of lumbar region, initial encounter    2. Viral infection        Blood pressure 118/85, pulse 78, temperature 98.5 °F (36.9 °C), temperature source Oral, resp. rate 18, height 5' 3\" (1.6 m), weight 148 lb 2.4 oz (67.2 kg), last menstrual period 11/04/2020, SpO2 99 %. DISPOSITION  Patient was discharged to home in good condition.     Comment: Please note this report has been produced using speech recognition software and may contain errors related to that system including errors in grammar, punctuation, and spelling, as well as words and phrases that may be inappropriate. If there are any questions or concerns please feel free to contact the dictating provider for clarification.      Rome Cooks, MD  12/04/20 [id number]

## 2020-12-05 ENCOUNTER — CARE COORDINATION (OUTPATIENT)
Dept: CARE COORDINATION | Age: 20
End: 2020-12-05

## 2020-12-05 LAB — SARS-COV-2, PCR: DETECTED

## 2020-12-05 NOTE — CARE COORDINATION
Patient contacted regarding Ranjith Rodriguez. Discussed COVID-19 related testing which was pending at this time. Test results were pending. Patient informed of results, if available? Yes    Care Transition Nurse/ Ambulatory Care Manager contacted the patient by telephone to perform post discharge assessment. Call within 2 business days of discharge: Yes. Verified name and  with patient as identifiers. Provided introduction to self, and explanation of the CTN/ACM role, and reason for call due to risk factors for infection and/or exposure to COVID-19. Symptoms reviewed with patient who verbalized the following symptoms: no new symptoms and no worsening symptoms. Due to no new or worsening symptoms encounter was not routed to provider for escalation. Discussed follow-up appointments. If no appointment was previously scheduled, appointment scheduling offered: Plans to call   [facility] follow up appointment(s): No future appointments. Non-[facility] follow up appointment(s): yes    Non-face-to-face services provided:  see above     Advance Care Planning:   Does patient have an Advance Directive:  not on file. Patient has following risk factors of: no known risk factors. CTN/ACM reviewed discharge instructions, medical action plan and red flags such as increased shortness of breath, increasing fever and signs of decompensation with patient who verbalized understanding. Discussed exposure protocols and quarantine with CDC Guidelines What to do if you are sick with coronavirus disease .  Patient was given an opportunity for questions and concerns. The patient agrees to contact the [facility] exposure line [phone], local [facility] department [place] [facility]: ([phone]) and PCP office for questions related to their healthcare. CTN/ACM provided contact information for future needs.     Reviewed and educated patient on any new and changed medications related to discharge diagnosis Patient/family/caregiver given information for [employer] and agrees to enroll no  Patient's preferred e-mail: na   Patient's preferred phone number: na  Based on Loop alert triggers, patient will be contacted by nurse care manager for worsening symptoms. Plan for follow-up call in 5-7 days based on severity of symptoms and risk factors.

## 2020-12-09 ENCOUNTER — CARE COORDINATION (OUTPATIENT)
Dept: CARE COORDINATION | Age: 20
End: 2020-12-09

## 2020-12-09 NOTE — CARE COORDINATION
Patient contacted regarding COVID-19 risk and screening. Discussed COVID-19 related testing which was available at this time. Test results were positive. Patient informed of results, if available? Yes. Care Transition Nurse/ Ambulatory Care Manager contacted the patient by telephone to perform follow-up assessment. Verified name and  with patient as identifiers. Patient has following risk factors of: no known risk factors. Symptoms reviewed with patient who verbalized the following symptoms: no new symptoms and no worsening symptoms. Due to no new or worsening symptoms encounter was not routed to provider for escalation. Education provided regarding infection prevention, and signs and symptoms of COVID-19 and when to seek medical attention with patient who verbalized understanding. Discussed exposure protocols and quarantine from [address] you at higher risk for severe illness  and given an opportunity for questions and concerns. The patient agrees to contact the COVID-19 hotline [phone] or PCP office for questions related to their healthcare. CTN/ACM provided contact information for future reference. From CDC: Are you at higher risk for severe illness?  Wash your hands often.  Avoid close contact (6 feet, which is about two arm lengths) with people who are sick.  Put distance between yourself and other people if COVID-19 is spreading in your community.  Clean and disinfect frequently touched surfaces.  Avoid all cruise travel and non-essential air travel.  Call your healthcare professional if you have concerns about COVID-19 and your underlying condition or if you are sick. For more information on steps you can take to protect yourself, see CDC's How to Jack for follow-up call in 7-14 days based on severity of symptoms and risk factors. Reports is feeling better but still has no sense of taste or smell.   Plans to call her  to

## 2020-12-10 ENCOUNTER — CARE COORDINATION (OUTPATIENT)
Dept: CARE COORDINATION | Age: 20
End: 2020-12-10

## 2020-12-10 NOTE — CARE COORDINATION
Patient contacted regarding COVID-19 risk and screening. Discussed COVID-19 related testing which was available at this time. Test results were positive. Patient informed of results, if available? Yes. Care Transition Nurse/ Ambulatory Care Manager contacted the patient by telephone to perform follow-up assessment. Verified name and  with patient as identifiers. Patient has following risk factors of: no known risk factors. Symptoms reviewed with patient who verbalized the following symptoms: no new symptoms and no worsening symptoms. Due to no new or worsening symptoms encounter was not routed to provider for escalation. Education provided regarding infection prevention, and signs and symptoms of COVID-19 and when to seek medical attention with patient who verbalized understanding. Discussed exposure protocols and quarantine from [address] you at higher risk for severe illness  and given an opportunity for questions and concerns. The patient agrees to contact the COVID-19 hotline [phone] or PCP office for questions related to their healthcare. CTN/ACM provided contact information for future reference. <[id number]>  From CDC: Are you at higher risk for severe illness?  Wash your hands often.  Avoid close contact (6 feet, which is about two arm lengths) with people who are sick.  Put distance between yourself and other people if COVID-19 is spreading in your community.  Clean and disinfect frequently touched surfaces.  Avoid all cruise travel and non-essential air travel.  Call your healthcare professional if you have concerns about COVID-19 and your underlying condition or if you are sick. For more information on steps you can take to protect yourself, see CDC's How to Jack for follow-up call in 7-14 days based on severity of symptoms and risk factors. Reports is feeling better.  Wanted to know results of pregnancy test since her period is late. Aware negative for pregnancy.

## 2020-12-15 ENCOUNTER — CARE COORDINATION (OUTPATIENT)
Dept: CARE COORDINATION | Age: 20
End: 2020-12-15

## 2021-05-04 ENCOUNTER — APPOINTMENT (OUTPATIENT)
Dept: CT IMAGING | Age: 21
End: 2021-05-04
Payer: COMMERCIAL

## 2021-05-04 ENCOUNTER — HOSPITAL ENCOUNTER (EMERGENCY)
Age: 21
Discharge: HOME | End: 2021-05-04
Attending: EMERGENCY MEDICINE
Payer: COMMERCIAL

## 2021-05-04 VITALS
DIASTOLIC BLOOD PRESSURE: 51 MMHG | OXYGEN SATURATION: 98 % | RESPIRATION RATE: 20 BRPM | TEMPERATURE: 99 F | SYSTOLIC BLOOD PRESSURE: 116 MMHG | HEART RATE: 84 BPM

## 2021-05-04 DIAGNOSIS — N12 PYELONEPHRITIS: Primary | ICD-10-CM

## 2021-05-04 LAB
A/G RATIO: 1.5 (ref 1.1–2.2)
ALBUMIN SERPL-MCNC: 4.6 G/DL (ref 3.4–5)
ALP BLD-CCNC: 85 U/L (ref 40–129)
ALT SERPL-CCNC: 16 U/L (ref 10–40)
ANION GAP SERPL CALCULATED.3IONS-SCNC: 8 MMOL/L (ref 3–16)
AST SERPL-CCNC: 18 U/L (ref 15–37)
BACTERIA: ABNORMAL /HPF
BASOPHILS ABSOLUTE: 0.1 K/UL (ref 0–0.2)
BASOPHILS RELATIVE PERCENT: 0.7 %
BILIRUB SERPL-MCNC: 0.4 MG/DL (ref 0–1)
BILIRUBIN URINE: NEGATIVE
BLOOD, URINE: ABNORMAL
BUN BLDV-MCNC: 10 MG/DL (ref 7–20)
CALCIUM SERPL-MCNC: 10.3 MG/DL (ref 8.3–10.6)
CHLORIDE BLD-SCNC: 107 MMOL/L (ref 99–110)
CLARITY: ABNORMAL
CO2: 24 MMOL/L (ref 21–32)
COLOR: YELLOW
CREAT SERPL-MCNC: 0.7 MG/DL (ref 0.6–1.1)
EOSINOPHILS ABSOLUTE: 0.1 K/UL (ref 0–0.6)
EOSINOPHILS RELATIVE PERCENT: 1 %
EPITHELIAL CELLS, UA: ABNORMAL /HPF (ref 0–5)
GFR AFRICAN AMERICAN: >60
GFR NON-AFRICAN AMERICAN: >60
GLOBULIN: 3 G/DL
GLUCOSE BLD-MCNC: 94 MG/DL (ref 70–99)
GLUCOSE URINE: NEGATIVE MG/DL
HCG QUALITATIVE: NEGATIVE
HCT VFR BLD CALC: 42.6 % (ref 36–48)
HEMOGLOBIN: 14.3 G/DL (ref 12–16)
KETONES, URINE: 15 MG/DL
LEUKOCYTE ESTERASE, URINE: ABNORMAL
LIPASE: 25 U/L (ref 13–60)
LYMPHOCYTES ABSOLUTE: 1.7 K/UL (ref 1–5.1)
LYMPHOCYTES RELATIVE PERCENT: 22.9 %
MCH RBC QN AUTO: 31.6 PG (ref 26–34)
MCHC RBC AUTO-ENTMCNC: 33.6 G/DL (ref 31–36)
MCV RBC AUTO: 94 FL (ref 80–100)
MICROSCOPIC EXAMINATION: YES
MONOCYTES ABSOLUTE: 0.4 K/UL (ref 0–1.3)
MONOCYTES RELATIVE PERCENT: 5.6 %
NEUTROPHILS ABSOLUTE: 5.3 K/UL (ref 1.7–7.7)
NEUTROPHILS RELATIVE PERCENT: 69.8 %
NITRITE, URINE: NEGATIVE
PDW BLD-RTO: 13 % (ref 12.4–15.4)
PH UA: 6.5 (ref 5–8)
PLATELET # BLD: 251 K/UL (ref 135–450)
PMV BLD AUTO: 8.7 FL (ref 5–10.5)
POTASSIUM REFLEX MAGNESIUM: 4.3 MMOL/L (ref 3.5–5.1)
PROTEIN UA: NEGATIVE MG/DL
RBC # BLD: 4.54 M/UL (ref 4–5.2)
RBC UA: ABNORMAL /HPF (ref 0–4)
SODIUM BLD-SCNC: 139 MMOL/L (ref 136–145)
SPECIFIC GRAVITY UA: 1.02 (ref 1–1.03)
TOTAL PROTEIN: 7.6 G/DL (ref 6.4–8.2)
URINE REFLEX TO CULTURE: YES
URINE TYPE: ABNORMAL
UROBILINOGEN, URINE: 0.2 E.U./DL
WBC # BLD: 7.6 K/UL (ref 4–11)
WBC UA: ABNORMAL /HPF (ref 0–5)

## 2021-05-04 PROCEDURE — 96375 TX/PRO/DX INJ NEW DRUG ADDON: CPT

## 2021-05-04 PROCEDURE — 99283 EMERGENCY DEPT VISIT LOW MDM: CPT

## 2021-05-04 PROCEDURE — 2580000003 HC RX 258: Performed by: EMERGENCY MEDICINE

## 2021-05-04 PROCEDURE — 81001 URINALYSIS AUTO W/SCOPE: CPT

## 2021-05-04 PROCEDURE — 80053 COMPREHEN METABOLIC PANEL: CPT

## 2021-05-04 PROCEDURE — 6360000004 HC RX CONTRAST MEDICATION: Performed by: EMERGENCY MEDICINE

## 2021-05-04 PROCEDURE — 83690 ASSAY OF LIPASE: CPT

## 2021-05-04 PROCEDURE — 84703 CHORIONIC GONADOTROPIN ASSAY: CPT

## 2021-05-04 PROCEDURE — 74177 CT ABD & PELVIS W/CONTRAST: CPT

## 2021-05-04 PROCEDURE — 96365 THER/PROPH/DIAG IV INF INIT: CPT

## 2021-05-04 PROCEDURE — 36415 COLL VENOUS BLD VENIPUNCTURE: CPT

## 2021-05-04 PROCEDURE — 85025 COMPLETE CBC W/AUTO DIFF WBC: CPT

## 2021-05-04 PROCEDURE — 87086 URINE CULTURE/COLONY COUNT: CPT

## 2021-05-04 PROCEDURE — 6360000002 HC RX W HCPCS: Performed by: EMERGENCY MEDICINE

## 2021-05-04 RX ORDER — SODIUM CHLORIDE, SODIUM LACTATE, POTASSIUM CHLORIDE, AND CALCIUM CHLORIDE .6; .31; .03; .02 G/100ML; G/100ML; G/100ML; G/100ML
1000 INJECTION, SOLUTION INTRAVENOUS ONCE
Status: DISCONTINUED | OUTPATIENT
Start: 2021-05-04 | End: 2021-05-04

## 2021-05-04 RX ORDER — CEPHALEXIN 500 MG/1
500 CAPSULE ORAL 4 TIMES DAILY
Qty: 40 CAPSULE | Refills: 0 | Status: SHIPPED | OUTPATIENT
Start: 2021-05-04 | End: 2021-05-14

## 2021-05-04 RX ORDER — NAPROXEN 500 MG/1
500 TABLET ORAL 2 TIMES DAILY PRN
Qty: 60 TABLET | Refills: 0 | Status: SHIPPED | OUTPATIENT
Start: 2021-05-04 | End: 2022-08-28

## 2021-05-04 RX ORDER — ONDANSETRON 2 MG/ML
4 INJECTION INTRAMUSCULAR; INTRAVENOUS
Status: DISCONTINUED | OUTPATIENT
Start: 2021-05-04 | End: 2021-05-04 | Stop reason: HOSPADM

## 2021-05-04 RX ORDER — KETOROLAC TROMETHAMINE 30 MG/ML
30 INJECTION, SOLUTION INTRAMUSCULAR; INTRAVENOUS ONCE
Status: COMPLETED | OUTPATIENT
Start: 2021-05-04 | End: 2021-05-04

## 2021-05-04 RX ORDER — ONDANSETRON 4 MG/1
4 TABLET, ORALLY DISINTEGRATING ORAL EVERY 8 HOURS PRN
Qty: 20 TABLET | Refills: 0 | Status: SHIPPED | OUTPATIENT
Start: 2021-05-04 | End: 2022-04-29 | Stop reason: SDUPTHER

## 2021-05-04 RX ORDER — ACETAMINOPHEN 500 MG
1000 TABLET ORAL EVERY 6 HOURS PRN
COMMUNITY
End: 2022-07-02

## 2021-05-04 RX ADMIN — ONDANSETRON 4 MG: 2 INJECTION INTRAMUSCULAR; INTRAVENOUS at 13:56

## 2021-05-04 RX ADMIN — IOPAMIDOL 100 ML: 755 INJECTION, SOLUTION INTRAVENOUS at 14:04

## 2021-05-04 RX ADMIN — CEFTRIAXONE 1000 MG: 1 INJECTION, POWDER, FOR SOLUTION INTRAMUSCULAR; INTRAVENOUS at 14:14

## 2021-05-04 RX ADMIN — KETOROLAC TROMETHAMINE 30 MG: 30 INJECTION, SOLUTION INTRAMUSCULAR; INTRAVENOUS at 13:54

## 2021-05-04 ASSESSMENT — PAIN DESCRIPTION - ORIENTATION: ORIENTATION: RIGHT

## 2021-05-04 ASSESSMENT — PAIN DESCRIPTION - PAIN TYPE
TYPE: ACUTE PAIN

## 2021-05-04 ASSESSMENT — PAIN DESCRIPTION - DESCRIPTORS: DESCRIPTORS: SHOOTING

## 2021-05-04 ASSESSMENT — PAIN DESCRIPTION - LOCATION: LOCATION: FLANK

## 2021-05-04 NOTE — ED TRIAGE NOTES
Patient presents to ED complaining of right flank pain x2 weeks, reports hx of kidney stones and states this feels similarly. Denies fever, denies abnormal vaginal discharge, denies urinary symptoms. Patient reports nausea and vomiting which started today and also reports headache starting this morning. Patient resting on bed, respirations even and easy at this time. No obvious distress. \

## 2021-05-04 NOTE — ED NOTES
Per MD, no blood cultures required prior to administration of ABX.      Nicky Mckinney RN  05/04/21 [id number]

## 2021-05-04 NOTE — ED NOTES
EMD to bedside discussing test results. Pt doesn't want any IV fluids because she feels \"much better. \". IV removed. Family at bedside.      Delfina Starr RN  05/04/21 1800

## 2021-05-04 NOTE — ED PROVIDER NOTES
[facility] Emergency Department    CHIEF COMPLAINT  Chief Complaint   Patient presents with    Flank Pain     right x2 weeks, denies fever, reports hx of kidney stones. Denies abnormal discharge, denies urinary symptoms.  Emesis     started today.  Headache     started today      HISTORY OF PRESENT ILLNESS  Saba Wong is a 23 y.o. female  who presents to the ED complaining of +nausea, vomiting, dehydation, and a history of kidney stones. She says she has R flank pain as well and a history of kidney stones and ESBL UTI in the past.  Has not had fevers at home. She has some chronic nausea and some chronic R flank pain but worsening the past 2 weeks in particular. No vaginal bleeding or discharge. She had some diarrhea yesterday but that was the only time. No dysuria hematuria or cloudy/smelly urine. No other complaints, modifying factors or associated symptoms. I have reviewed the following from the nursing documentation. Past Medical History:   Diagnosis Date    ESBL (extended spectrum beta-lactamase) producing bacteria infection 01/05/2020    urine    Kidney stone      History reviewed. No pertinent surgical history. History reviewed. No pertinent family history.   Social History     Socioeconomic History    Marital status: Single     Spouse name: Not on file    Number of children: Not on file    Years of education: Not on file    Highest education level: Not on file   Occupational History    Not on file   Social Needs    Financial resource strain: Not on file    Food insecurity     Worry: Not on file     Inability: Not on file    Transportation needs     Medical: Not on file     Non-medical: Not on file   Tobacco Use    Smoking status: Never Smoker    Smokeless tobacco: Never Used   Substance and Sexual Activity    Alcohol use: No    Drug use: Not Currently     Types: Marijuana    Sexual activity: Not on file   Lifestyle    Physical activity     Days per week: Not on file     Minutes per session: Not on file    Stress: Not on file   Relationships    Social connections     Talks on phone: Not on file     Gets together: Not on file     Attends Anabaptist service: Not on file     Active member of club or organization: Not on file     Attends meetings of clubs or organizations: Not on file     Relationship status: Not on file    Intimate partner violence     Fear of current or ex partner: Not on file     Emotionally abused: Not on file     Physically abused: Not on file     Forced sexual activity: Not on file   Other Topics Concern    Not on file   Social History Narrative    Not on file     Current Facility-Administered Medications   Medication Dose Route Frequency Provider Last Rate Last Admin    ondansetron (ZOFRAN) injection 4 mg  4 mg Intravenous Q1H PRN Kuldip Licea MD   4 mg at 05/04/21 1356     Current Outpatient Medications   Medication Sig Dispense Refill    acetaminophen (TYLENOL) 500 MG tablet Take 1,000 mg by mouth every 6 hours as needed for Pain      cephALEXin (KEFLEX) 500 MG capsule Take 1 capsule by mouth 4 times daily for 10 days 40 capsule 0    ondansetron (ZOFRAN ODT) 4 MG disintegrating tablet Take 1 tablet by mouth every 8 hours as needed for Nausea or Vomiting 20 tablet 0    naproxen (NAPROSYN) 500 MG tablet Take 1 tablet by mouth 2 times daily as needed for Pain 60 tablet 0     Allergies   Allergen Reactions    Ritalin [Methylphenidate] Shortness Of Breath    Oxycodone      Difficulty breathing and itching  Difficulty breathing and itching         REVIEW OF SYSTEMS  10 systems reviewed, pertinent positives per HPI otherwise noted to be negative. PHYSICAL EXAM  /80   Pulse 67   Temp 99 °F (37.2 °C) (Oral)   Resp 18   SpO2 99%    GENERAL APPEARANCE: Awake and alert. Cooperative. No distress. HENT: Normocephalic. Atraumatic. Mucous membranes are dry. NECK: Supple. EYES: PERRL. EOM's grossly intact.   HEART/CHEST: RRR. No murmurs. No chest wall tenderness. LUNGS: Respirations unlabored. CTAB. Good air exchange. Speaking comfortably in full sentences. ABDOMEN: Mild RLQ, R flank and R CVAT, no RUQ or any L sided abd flank or CVA tenderness. Soft. Non-distended. No masses. No organomegaly. No guarding or rebound. Normal bowel sounds throughout. MUSCULOSKELETAL: No extremity edema. Compartments soft. No deformity. No tenderness in the extremities. All extremities neurovascularly intact. SKIN: Warm and dry. No acute rashes. NEUROLOGICAL: Alert and oriented. CN's 2-12 intact. No gross facial drooping. Strength 5/5, sensation intact. 2 plus DTR's in knees bilaterally. Gait normal.  PSYCHIATRIC: Normal mood and affect. LABS  I have reviewed all labs for this visit.    Results for orders placed or performed during the hospital encounter of 05/04/21   HCG Qualitative, Serum   Result Value Ref Range    hCG Qual Negative Detects HCG level >10 MIU/mL   CBC Auto Differential   Result Value Ref Range    WBC 7.6 4.0 - 11.0 K/uL    RBC 4.54 4.00 - 5.20 M/uL    Hemoglobin 14.3 12.0 - 16.0 g/dL    Hematocrit 42.6 36.0 - 48.0 %    MCV 94.0 80.0 - 100.0 fL    MCH 31.6 26.0 - 34.0 pg    MCHC 33.6 31.0 - 36.0 g/dL    RDW 13.0 12.4 - 15.4 %    Platelets 285 042 - 067 K/uL    MPV 8.7 5.0 - 10.5 fL    Neutrophils % 69.8 %    Lymphocytes % 22.9 %    Monocytes % 5.6 %    Eosinophils % 1.0 %    Basophils % 0.7 %    Neutrophils Absolute 5.3 1.7 - 7.7 K/uL    Lymphocytes Absolute 1.7 1.0 - 5.1 K/uL    Monocytes Absolute 0.4 0.0 - 1.3 K/uL    Eosinophils Absolute 0.1 0.0 - 0.6 K/uL    Basophils Absolute 0.1 0.0 - 0.2 K/uL   Comprehensive Metabolic Panel w/ Reflex to MG   Result Value Ref Range    Sodium 139 136 - 145 mmol/L    Potassium reflex Magnesium 4.3 3.5 - 5.1 mmol/L    Chloride 107 99 - 110 mmol/L    CO2 24 21 - 32 mmol/L    Anion Gap 8 3 - 16    Glucose 94 70 - 99 mg/dL    BUN 10 7 - 20 mg/dL    CREATININE 0.7 0.6 - 1.1 mg/dL    GFR Non- >60 >60    GFR African American >60 >60    Calcium 10.3 8.3 - 10.6 mg/dL    Total Protein 7.6 6.4 - 8.2 g/dL    Albumin 4.6 3.4 - 5.0 g/dL    Albumin/Globulin Ratio 1.5 1.1 - 2.2    Total Bilirubin 0.4 0.0 - 1.0 mg/dL    Alkaline Phosphatase 85 40 - 129 U/L    ALT 16 10 - 40 U/L    AST 18 15 - 37 U/L    Globulin 3.0 g/dL   Lipase   Result Value Ref Range    Lipase 25.0 13.0 - 60.0 U/L   Urinalysis Reflex to Culture    Specimen: Urine, clean catch   Result Value Ref Range    Color, UA Yellow Straw/Yellow    Clarity, UA CLOUDY (A) Clear    Glucose, Ur Negative Negative mg/dL    Bilirubin Urine Negative Negative    Ketones, Urine 15 (A) Negative mg/dL    Specific Gravity, UA 1.025 1.005 - 1.030    Blood, Urine TRACE-LYSED (A) Negative    pH, UA 6.5 5.0 - 8.0    Protein, UA Negative Negative mg/dL    Urobilinogen, Urine 0.2 <2.0 E.U./dL    Nitrite, Urine Negative Negative    Leukocyte Esterase, Urine TRACE (A) Negative    Microscopic Examination YES     Urine Type Voided     Urine Reflex to Culture Yes    Microscopic Urinalysis   Result Value Ref Range    WBC, UA 10-20 (A) 0 - 5 /HPF    RBC, UA 0-2 0 - 4 /HPF    Epithelial Cells, UA 11-20 (A) 0 - 5 /HPF    Bacteria, UA 1+ (A) None Seen /HPF       RADIOLOGY    Ct Abdomen Pelvis W Iv Contrast Additional Contrast? None    Result Date: 5/4/2021  EXAMINATION: CT OF THE ABDOMEN AND PELVIS WITH CONTRAST 5/4/2021 2:06 pm TECHNIQUE: CT of the abdomen and pelvis was performed with the administration of intravenous contrast. Multiplanar reformatted images are provided for review. Dose modulation, iterative reconstruction, and/or weight based adjustment of the mA/kV was utilized to reduce the radiation dose to as low as reasonably achievable.  COMPARISON: 05/28/2020 HISTORY: ORDERING SYSTEM PROVIDED HISTORY: R flank pain eval pyelo vs stone TECHNOLOGIST PROVIDED HISTORY: Additional Contrast?->None Reason for exam:->R flank pain eval pyelo vs stone Decision Support Exception - unselect if not a suspected or confirmed emergency medical condition->Emergency Medical Condition (MA) Reason for Exam: PT. C/O RADIATING RT. FLANK PAIN THAT STARTS RT. SIDE RIBS WITH N/V/D THAT STARTED LAST NIGHT Acuity: Acute Type of Exam: Initial Relevant Medical/Surgical History: HX KIDNEY STONE FINDINGS: Lower Chest: There is no consolidation or effusion. Organs: The liver, pancreas, spleen, kidneys and adrenals are unremarkable. There is no urinary tract calculus or obstruction. GI/Bowel: There is no bowel dilatation, wall thickening or obstruction. The appendix is normal. Pelvis: The pelvic organs and urinary bladder are unremarkable. Peritoneum/Retroperitoneum: There is no free air, free fluid or intraperitoneal inflammatory change. There is no adenopathy. Bones/Soft Tissues: There is no acute fracture or aggressive osseous lesion. No acute abdominopelvic abnormality. ED COURSE/MDM  Patient seen and evaluated. Old records reviewed. Labs and imaging reviewed and results discussed with patient. After initial evaluation, differential diagnostic considerations included: kidney stone, pyelonephritis, UTI, appendicitis, bowel obstruction, diverticulitis, gastroenteritis    The patient's ED workup was notable for right flank pain with equivocal but likely infected urinalysis. She does not have a leukocytosis or fever, she is not pregnant, her blood work is otherwise unremarkable. She does have clinical pyelonephritis even though her imaging is unremarkable. Nothing to suggest infected kidney stone or any other ureterolithiasis issues. She was given Rocephin here and Keflex for home, urine culture pending, follow-up with PCP in several days advised and return for new or worsening symptoms in the meantime, hydration encouraged for home.   She received essentially none of her lactated Ringer bolus but is tolerating oral intake and want to leave rather than wait for the IV fluids at that point. During the patient's ED course, the patient was given:  Medications   ondansetron (ZOFRAN) injection 4 mg (4 mg Intravenous Given 5/4/21 1356)   cefTRIAXone (ROCEPHIN) 1000 mg IVPB in 50 mL D5W minibag (1,000 mg Intravenous New Bag 5/4/21 1414)   ketorolac (TORADOL) injection 30 mg (30 mg Intravenous Given 5/4/21 1354)   iopamidol (ISOVUE-370) 76 % injection 100 mL (100 mLs Intravenous Given 5/4/21 1404)        CLINICAL IMPRESSION  1. Pyelonephritis        Blood pressure 119/80, pulse 67, temperature 99 °F (37.2 °C), temperature source Oral, resp. rate 18, SpO2 99 %. DISPOSITION  Hermann Chavez was discharged to home in stable condition. I have discussed the findings of today's workup with the patient and addressed the patient's questions and concerns. Important warning signs as well as new or worsening symptoms which would necessitate immediate return to the ED were discussed. The plan is to discharge from the ED at this time, and the patient is in stable condition. The patient acknowledged understanding is agreeable with this plan. Patient was given scripts for the following medications. I counseled patient how to take these medications. New Prescriptions    CEPHALEXIN (KEFLEX) 500 MG CAPSULE    Take 1 capsule by mouth 4 times daily for 10 days    NAPROXEN (NAPROSYN) 500 MG TABLET    Take 1 tablet by mouth 2 times daily as needed for Pain    ONDANSETRON (ZOFRAN ODT) 4 MG DISINTEGRATING TABLET    Take 1 tablet by mouth every 8 hours as needed for Nausea or Vomiting       Follow-up with:  [facility]  [address] [place] [address]  [phone]    Schedule an appointment as soon as possible for a visit in 2 days  For symptom re-evaluation    [facility] [address] [phone]  Go to   If symptoms worsen      DISCLAIMER: This chart was created using Dragon dictation software.   Efforts were made

## 2021-05-04 NOTE — ED NOTES
This RN to bedside to administer ABX. Patient currently in CT, will administer on return to room.      Hafsa Arriola RN  05/04/21 1923

## 2021-05-05 LAB — URINE CULTURE, ROUTINE: NORMAL

## 2022-04-29 ENCOUNTER — HOSPITAL ENCOUNTER (EMERGENCY)
Age: 22
Discharge: HOME | End: 2022-04-29
Attending: EMERGENCY MEDICINE
Payer: COMMERCIAL

## 2022-04-29 VITALS
RESPIRATION RATE: 14 BRPM | DIASTOLIC BLOOD PRESSURE: 81 MMHG | WEIGHT: 156.53 LBS | TEMPERATURE: 98.8 F | HEART RATE: 68 BPM | BODY MASS INDEX: 27.73 KG/M2 | OXYGEN SATURATION: 98 % | HEIGHT: 63 IN | SYSTOLIC BLOOD PRESSURE: 125 MMHG

## 2022-04-29 DIAGNOSIS — J02.0 STREP PHARYNGITIS: Primary | ICD-10-CM

## 2022-04-29 PROCEDURE — 6370000000 HC RX 637 (ALT 250 FOR IP): Performed by: EMERGENCY MEDICINE

## 2022-04-29 PROCEDURE — 6360000002 HC RX W HCPCS: Performed by: EMERGENCY MEDICINE

## 2022-04-29 PROCEDURE — 96372 THER/PROPH/DIAG INJ SC/IM: CPT

## 2022-04-29 PROCEDURE — 99284 EMERGENCY DEPT VISIT MOD MDM: CPT

## 2022-04-29 RX ORDER — ONDANSETRON 4 MG/1
4 TABLET, ORALLY DISINTEGRATING ORAL EVERY 8 HOURS PRN
Qty: 20 TABLET | Refills: 0 | Status: SHIPPED | OUTPATIENT
Start: 2022-04-29 | End: 2022-07-02

## 2022-04-29 RX ORDER — PHENOL 1.4 %
1 AEROSOL, SPRAY (ML) MUCOUS MEMBRANE
Qty: 1 ML | Refills: 0 | Status: SHIPPED | OUTPATIENT
Start: 2022-04-29 | End: 2022-08-28

## 2022-04-29 RX ORDER — ONDANSETRON 4 MG/1
4 TABLET, ORALLY DISINTEGRATING ORAL ONCE
Status: COMPLETED | OUTPATIENT
Start: 2022-04-29 | End: 2022-04-29

## 2022-04-29 RX ORDER — ACETAMINOPHEN 500 MG
1000 TABLET ORAL ONCE
Status: COMPLETED | OUTPATIENT
Start: 2022-04-29 | End: 2022-04-29

## 2022-04-29 RX ADMIN — ACETAMINOPHEN 1000 MG: 500 TABLET ORAL at 08:06

## 2022-04-29 RX ADMIN — PENICILLIN G BENZATHINE AND PENICILLIN G PROCAINE 1.2 MILLION UNITS: 600000; 600000 INJECTION, SUSPENSION INTRAMUSCULAR at 08:07

## 2022-04-29 RX ADMIN — ONDANSETRON 4 MG: 4 TABLET, ORALLY DISINTEGRATING ORAL at 08:06

## 2022-04-29 ASSESSMENT — PAIN DESCRIPTION - LOCATION: LOCATION: HEAD;THROAT;EAR

## 2022-04-29 ASSESSMENT — PAIN SCALES - GENERAL
PAINLEVEL_OUTOF10: 10
PAINLEVEL_OUTOF10: 6
PAINLEVEL_OUTOF10: 10

## 2022-04-29 ASSESSMENT — PAIN DESCRIPTION - DESCRIPTORS: DESCRIPTORS: THROBBING;ACHING

## 2022-04-29 ASSESSMENT — PAIN DESCRIPTION - ORIENTATION: ORIENTATION: LEFT

## 2022-04-29 ASSESSMENT — PAIN - FUNCTIONAL ASSESSMENT
PAIN_FUNCTIONAL_ASSESSMENT: 0-10
PAIN_FUNCTIONAL_ASSESSMENT: 0-10

## 2022-04-29 ASSESSMENT — PAIN DESCRIPTION - PAIN TYPE: TYPE: ACUTE PAIN

## 2022-04-29 NOTE — ED PROVIDER NOTES
[address]  Chief Complaint   Patient presents with    Headache     C/o left sided headache, ear pain, sore throat and nausea x 2 days.  Pharyngitis    Otalgia     HISTORY OF PRESENT ILLNESS  Thuy Wood is a 19 y.o. female who presents to the ED complaining of about 2 days of some nausea when over the past day or so she is also developed a left-sided sore throat with some left-sided neck and ear soreness/pain. She does not really have any significant pain on the right side of her throat or neck. No fevers objectively but has had some chills. No cough or chest pain or shortness of breath. Denies any nasal congestion. She does have pain with swallowing but no difficulty with swallowing no difficulty with breathing or throat closing sensation. She denies chance of pregnancy. No other complaints, modifying factors or associated symptoms. Nursing notes reviewed. Past Medical History:   Diagnosis Date    ESBL (extended spectrum beta-lactamase) producing bacteria infection 01/05/2020    urine    Kidney stone      History reviewed. No pertinent surgical history. History reviewed. No pertinent family history.   Social History     Socioeconomic History    Marital status: Single     Spouse name: Not on file    Number of children: Not on file    Years of education: Not on file    Highest education level: Not on file   Occupational History    Not on file   Tobacco Use    Smoking status: Never Smoker    Smokeless tobacco: Never Used   Vaping Use    Vaping Use: Never used   Substance and Sexual Activity    Alcohol use: No    Drug use: Not Currently     Types: Marijuana Garon Kettle)    Sexual activity: Not on file   Other Topics Concern    Not on file   Social History Narrative    Not on file     Social Determinants of Health     Financial Resource Strain:     Difficulty of Paying Living Expenses: Not on file   Food Insecurity:     Worried About Running Out of Food in the Last Year: Not on file    Ran Out of Food in the Last Year: Not on file   Transportation Needs:     Lack of Transportation (Medical): Not on file    Lack of Transportation (Non-Medical):  Not on file   Physical Activity:     Days of Exercise per Week: Not on file    Minutes of Exercise per Session: Not on file   Stress:     Feeling of Stress : Not on file   Social Connections:     Frequency of Communication with Friends and Family: Not on file    Frequency of Social Gatherings with Friends and Family: Not on file    Attends Episcopal Services: Not on file    Active Member of [address] [place] [address] [employer] or Organizations: Not on file    Attends Club or Organization Meetings: Not on file    Marital Status: Not on file   Intimate Partner Violence:     Fear of Current or Ex-Partner: Not on file    Emotionally Abused: Not on file    Physically Abused: Not on file    Sexually Abused: Not on file   Housing Stability:     Unable to Pay for Housing in the Last Year: Not on file    Number of [place] in the Last Year: Not on file    Unstable Housing in the Last Year: Not on file     Current Facility-Administered Medications   Medication Dose Route Frequency Provider Last Rate Last Admin    penicillin G benzathine and procaine (BICILLIN C-R) 1782400 UNIT per 2ML suspension 1.2 Million Units  1.2 Million Units IntraMUSCular Once Corinna Asher MD        ondansetron (ZOFRAN-ODT) disintegrating tablet 4 mg  4 mg Oral Once Corinna Asher MD        acetaminophen (TYLENOL) tablet 1,000 mg  1,000 mg Oral Once Corinna Asher MD         Current Outpatient Medications   Medication Sig Dispense Refill    ondansetron (ZOFRAN ODT) 4 MG disintegrating tablet Take 1 tablet by mouth every 8 hours as needed for Nausea or Vomiting 20 tablet 0    phenol (CHLORASEPTIC) 1.4 % LIQD mouth spray Take 1 drop by mouth every 2 hours as needed for Sore Throat 1 mL 0    acetaminophen (TYLENOL) 500 MG tablet Take 1,000 mg by mouth every 6 hours as needed for Pain      naproxen (NAPROSYN) 500 MG tablet Take 1 tablet by mouth 2 times daily as needed for Pain 60 tablet 0     Allergies   Allergen Reactions    Ritalin [Methylphenidate] Shortness Of Breath    Oxycodone      Difficulty breathing and itching  Difficulty breathing and itching         REVIEW OF SYSTEMS  6 systems reviewed, pertinent positives per HPI otherwise noted to be negative    PHYSICAL EXAM   /81   Pulse 68   Temp 98.8 °F (37.1 °C) (Oral)   Resp 14   Ht 5' 3\" (1.6 m)   Wt 156 lb 8.4 oz (71 kg)   SpO2 98%   BMI 27.73 kg/m²    GENERAL APPEARANCE: Awake and alert. Cooperative. No acute distress. HEAD: Normocephalic. Atraumatic. EYES: PERRL. EOM's grossly intact. ENT: Mucous membranes are moist.  Oropharynx is diffusely erythematous. The tonsils are symmetric and uvula is midline. There is some mild exudate noted bilaterally, symmetrically so, tongue appears normal, floor the mouth is soft, normal dentition. TMs clear bilaterally. NECK: Supple. Normal ROM. Left-sided cervical lymphadenopathy is present and tender. The right side does not have any adenopathy present  CHEST: Equal symmetric chest rise. RRR  LUNGS: Breathing is unlabored. Speaking comfortably in full sentences. CTAB  ABDOMEN: Nondistended, nontender  EXTREMITIES: MAEE. No acute deformities. SKIN: Warm and dry. No acute rashes. NEUROLOGICAL: Alert and oriented. Strength is 5/5 in all extremities and sensation is intact. ED COURSE/MDM  Differential diagnosis considerations included: viral URI, nasal congestion, bacterial sinusitis, viral/strep pharyngitis, otitis media, otitis externa, RPA, PTA    The patient's ED course was notable for what concerns me for clinical strep pharyngitis. She does not have any nasal congestion or chest symptoms or cough. She has had subjective chills although is afebrile here.   She does have exudative pharyngitis and cervical lymphadenopathy on the left which would explain her tenderness to the side although the exudate is bilateral.  The tonsils are symmetric and there is nothing clinically on exam to suggest a PTA or RPA. Given her history and exam I will treat empirically for strep pharyngitis and lieu of diagnostics and the patient was agreement with this plan. She was offered p.o. versus IM treatment and opted for Bicillin. Patient still to follow-up with her PCP in a week to consider additional diagnostics if not improving such as mononucleosis testing. Patient will be treated symptomatically with Zofran and Chloraseptic spray, she already has naproxen and took some prior to arrival and I recommended she continue this as well. Patient was given scripts for the following medications. I counseled patient how to take these medications. New Prescriptions    ONDANSETRON (ZOFRAN ODT) 4 MG DISINTEGRATING TABLET    Take 1 tablet by mouth every 8 hours as needed for Nausea or Vomiting    PHENOL (CHLORASEPTIC) 1.4 % LIQD MOUTH SPRAY    Take 1 drop by mouth every 2 hours as needed for Sore Throat         CLINICAL IMPRESSION  1. Strep pharyngitis        Blood pressure 125/81, pulse 68, temperature 98.8 °F (37.1 °C), temperature source Oral, resp. rate 14, height 5' 3\" (1.6 m), weight 156 lb 8.4 oz (71 kg), SpO2 98 %. DISPOSITION    I have discussed the findings of today's workup with the patient and addressed the patient's questions and concerns. Important warning signs as well as new or worsening symptoms which would necessitate immediate return to the ED were discussed. The plan is to discharge from the ED at this time, and the patient is in stable condition. The patient acknowledged understanding is agreeable with this plan.       Follow-up with:  [address] [place] [address]  [phone] [place] [address]  [phone]    Schedule an appointment as soon as possible for a visit in 1 week  For symptom re-evaluation    [facility] [address]  [phone]  Go to   If symptoms worsen      This chart was created using Dragon dictation software. Efforts were made by me to ensure accuracy, however some errors may be present due to limitations of this technology.         Luz Jones MD  04/29/22 0283

## 2022-04-29 NOTE — LETTER
[facility] [address]  Phone: [phone]               April 29, 2022    Patient: Lucius Woo   YOB: 2000   Date of Visit: 4/29/2022       To Whom It May Concern:    Lucius Woo was seen and treated in our emergency department on 4/29/2022. She may return to work on 4/30/22.       Sincerely,       Gene Webb RN         Signature:__________________________________

## 2022-07-02 ENCOUNTER — HOSPITAL ENCOUNTER (EMERGENCY)
Age: 22
Discharge: HOME | End: 2022-07-02
Attending: EMERGENCY MEDICINE
Payer: COMMERCIAL

## 2022-07-02 VITALS
BODY MASS INDEX: 27.5 KG/M2 | TEMPERATURE: 99.4 F | WEIGHT: 155.2 LBS | OXYGEN SATURATION: 98 % | DIASTOLIC BLOOD PRESSURE: 90 MMHG | HEART RATE: 97 BPM | HEIGHT: 63 IN | RESPIRATION RATE: 18 BRPM | SYSTOLIC BLOOD PRESSURE: 133 MMHG

## 2022-07-02 DIAGNOSIS — R52 BODY ACHES: ICD-10-CM

## 2022-07-02 DIAGNOSIS — R19.7 DIARRHEA, UNSPECIFIED TYPE: ICD-10-CM

## 2022-07-02 DIAGNOSIS — Z71.1 CONCERN ABOUT STD IN FEMALE WITHOUT DIAGNOSIS: Primary | ICD-10-CM

## 2022-07-02 LAB
AMORPHOUS: ABNORMAL /HPF
BACTERIA WET PREP: ABNORMAL
BILIRUBIN URINE: NEGATIVE
BLOOD, URINE: ABNORMAL
CLARITY: CLEAR
CLUE CELLS: ABNORMAL
COLOR: YELLOW
EPITHELIAL CELLS WET PREP: ABNORMAL
EPITHELIAL CELLS, UA: ABNORMAL /HPF (ref 0–5)
GLUCOSE URINE: NEGATIVE MG/DL
HCG(URINE) PREGNANCY TEST: NEGATIVE
HYALINE CASTS: ABNORMAL /LPF (ref 0–2)
KETONES, URINE: ABNORMAL MG/DL
LEUKOCYTE ESTERASE, URINE: NEGATIVE
MICROSCOPIC EXAMINATION: YES
MUCUS: ABNORMAL /LPF
NITRITE, URINE: NEGATIVE
PH UA: 6 (ref 5–8)
PROTEIN UA: NEGATIVE MG/DL
RBC UA: ABNORMAL /HPF (ref 0–4)
RBC WET PREP: ABNORMAL
SARS-COV-2, NAAT: NOT DETECTED
SOURCE WET PREP: ABNORMAL
SPECIFIC GRAVITY UA: 1.02 (ref 1–1.03)
TRICHOMONAS PREP: ABNORMAL
URINE REFLEX TO CULTURE: ABNORMAL
URINE TYPE: ABNORMAL
UROBILINOGEN, URINE: 0.2 E.U./DL
WBC UA: ABNORMAL /HPF (ref 0–5)
WBC WET PREP: ABNORMAL
YEAST WET PREP: ABNORMAL

## 2022-07-02 PROCEDURE — 81001 URINALYSIS AUTO W/SCOPE: CPT

## 2022-07-02 PROCEDURE — 87591 N.GONORRHOEAE DNA AMP PROB: CPT

## 2022-07-02 PROCEDURE — 87491 CHLMYD TRACH DNA AMP PROBE: CPT

## 2022-07-02 PROCEDURE — 87635 SARS-COV-2 COVID-19 AMP PRB: CPT

## 2022-07-02 PROCEDURE — 99283 EMERGENCY DEPT VISIT LOW MDM: CPT

## 2022-07-02 PROCEDURE — 87210 SMEAR WET MOUNT SALINE/INK: CPT

## 2022-07-02 PROCEDURE — 84703 CHORIONIC GONADOTROPIN ASSAY: CPT

## 2022-07-02 PROCEDURE — 6370000000 HC RX 637 (ALT 250 FOR IP): Performed by: EMERGENCY MEDICINE

## 2022-07-02 RX ORDER — LOPERAMIDE HYDROCHLORIDE 2 MG/1
2 CAPSULE ORAL 4 TIMES DAILY PRN
Qty: 10 CAPSULE | Refills: 0 | Status: SHIPPED | OUTPATIENT
Start: 2022-07-02 | End: 2022-07-12

## 2022-07-02 RX ORDER — ACETAMINOPHEN 500 MG
1000 TABLET ORAL EVERY 8 HOURS PRN
Qty: 40 TABLET | Refills: 0 | Status: SHIPPED | OUTPATIENT
Start: 2022-07-02 | End: 2022-08-28

## 2022-07-02 RX ORDER — IBUPROFEN 600 MG/1
600 TABLET ORAL ONCE
Status: COMPLETED | OUTPATIENT
Start: 2022-07-02 | End: 2022-07-02

## 2022-07-02 RX ORDER — ONDANSETRON 4 MG/1
4 TABLET, ORALLY DISINTEGRATING ORAL EVERY 8 HOURS PRN
Qty: 20 TABLET | Refills: 0 | Status: SHIPPED | OUTPATIENT
Start: 2022-07-02 | End: 2022-08-28

## 2022-07-02 RX ORDER — ONDANSETRON 4 MG/1
4 TABLET, ORALLY DISINTEGRATING ORAL ONCE
Status: COMPLETED | OUTPATIENT
Start: 2022-07-02 | End: 2022-07-02

## 2022-07-02 RX ADMIN — IBUPROFEN 600 MG: 600 TABLET, FILM COATED ORAL at 20:36

## 2022-07-02 RX ADMIN — ONDANSETRON 4 MG: 4 TABLET, ORALLY DISINTEGRATING ORAL at 20:36

## 2022-07-02 ASSESSMENT — PAIN - FUNCTIONAL ASSESSMENT
PAIN_FUNCTIONAL_ASSESSMENT: NONE - DENIES PAIN
PAIN_FUNCTIONAL_ASSESSMENT: NONE - DENIES PAIN

## 2022-07-02 NOTE — ED TRIAGE NOTES
Pt states she was around uncle who was COVID +, pt having diarrhea, chills and body aches x3 days. Pt also wants to get tested for STDs, denies symptoms.

## 2022-07-03 NOTE — ED PROVIDER NOTES
CHIEF COMPLAINT  Concern For COVID-19 (pt was around uncle who was COVID +, pt having diarrhea, chills and body aches x3 days. ) and Exposure to STD (pt states she just wants to get checked while here)      Domingo0 Latoya Collado is a 19 y.o. female who  has a past medical history of ESBL (extended spectrum beta-lactamase) producing bacteria infection and Kidney stone. presents to the ED complaining of diarrhea, chills and body aches over the past 3 days. Patient states her stools are watery. Denies any blood per rectum. No documented fevers but states she has had chills. States she has abdominal cramping which is generalized. Denies any specific area of abdominal pain. Denies any vaginal discharge or vaginal bleeding. States she would like to be checked for STDs while she is here. Normal urinary habits. Denies any chest pain, shortness of breath or cough. States she did have exposure to COVID-19 with her uncle recently. States she did take ibuprofen 1 hour prior to coming to the emergency room. .   No other complaints, modifying factors or associated symptoms. Nursing notes reviewed. Past Medical History:   Diagnosis Date    ESBL (extended spectrum beta-lactamase) producing bacteria infection 01/05/2020    urine    Kidney stone      History reviewed. No pertinent surgical history. History reviewed. No pertinent family history.   Social History     Socioeconomic History    Marital status: Single     Spouse name: Not on file    Number of children: Not on file    Years of education: Not on file    Highest education level: Not on file   Occupational History    Not on file   Tobacco Use    Smoking status: Never Smoker    Smokeless tobacco: Never Used   Vaping Use    Vaping Use: Never used   Substance and Sexual Activity    Alcohol use: No    Drug use: Not Currently     Types: Marijuana Alessandro Semen)    Sexual activity: Not on file   Other Topics Concern    Not on file Social History Narrative    Not on file     Social Determinants of Health     Financial Resource Strain:     Difficulty of Paying Living Expenses: Not on file   Food Insecurity:     Worried About Running Out of Food in the Last Year: Not on file    Damaris of Food in the Last Year: Not on file   Transportation Needs:     Lack of Transportation (Medical): Not on file    Lack of Transportation (Non-Medical): Not on file   Physical Activity:     Days of Exercise per Week: Not on file    Minutes of Exercise per Session: Not on file   Stress:     Feeling of Stress : Not on file   Social Connections:     Frequency of Communication with Friends and Family: Not on file    Frequency of Social Gatherings with Friends and Family: Not on file    Attends Gnosticist Services: Not on file    Active Member of [address] [place] [address] [employer] or Organizations: Not on file    Attends Club or Organization Meetings: Not on file    Marital Status: Not on file   Intimate Partner Violence:     Fear of Current or Ex-Partner: Not on file    Emotionally Abused: Not on file    Physically Abused: Not on file    Sexually Abused: Not on file   Housing Stability:     Unable to Pay for Housing in the Last Year: Not on file    Number of [place] in the Last Year: Not on file    Unstable Housing in the Last Year: Not on file     No current facility-administered medications for this encounter.      Current Outpatient Medications   Medication Sig Dispense Refill    ondansetron (ZOFRAN ODT) 4 MG disintegrating tablet Take 1 tablet by mouth every 8 hours as needed for Nausea 20 tablet 0    acetaminophen (TYLENOL) 500 MG tablet Take 2 tablets by mouth every 8 hours as needed for Fever 40 tablet 0    loperamide (RA ANTI-DIARRHEAL) 2 MG capsule Take 1 capsule by mouth 4 times daily as needed for Diarrhea 10 capsule 0    phenol (CHLORASEPTIC) 1.4 % LIQD mouth spray Take 1 drop by mouth every 2 hours as needed for Sore Throat 1 mL 0    naproxen (NAPROSYN) 500 MG tablet Take 1 tablet by mouth 2 times daily as needed for Pain 60 tablet 0     Allergies   Allergen Reactions    Ritalin [Methylphenidate] Shortness Of Breath    Oxycodone      Difficulty breathing and itching  Difficulty breathing and itching           REVIEW OF SYSTEMS  (up to 7 for level 4, 8 or more for level 5) pertinent positives per HPI otherwise noted to be negative    PHYSICAL EXAM  BP (!) 133/90   Pulse 97   Temp 99.4 °F (37.4 °C) (Oral)   Resp 18   Ht 5' 3\" (1.6 m)   Wt 155 lb 3.3 oz (70.4 kg)   SpO2 98%   BMI 27.49 kg/m²      CONSTITUTIONAL: AOx4, cooperative with exam, afebrile   HEAD: normocephalic, atraumatic   EYES: PERRL, EOMI, anicteric sclera   ENT: Moist mucous membranes, uvula midline, no swelling in the posterior pharynx   NECK: Supple, symmetric, trachea midline, no meningismus   LUNGS: Bilateral breath sounds, CTAB, no rales/ronchi/wheezes   CARDIOVASCULAR: RRR, normal S1/S2, no m/r/g, 2+ pulses throughout   ABDOMEN: Soft, non-tender, non-distended, no rigidity, no peritoneal signs, negative Rovsing, negative McBurney's point, negative Charlton sign +BS   NEUROLOGIC:  MAEx4, GCS 15   MUSCULOSKELETAL: No clubbing, cyanosis or edema   SKIN: No rash, pallor or wounds on exposed surfaces     Patient deferred pelvic exam.    RADIOLOGY  X-RAYS:  I have reviewed radiologic plain film image(s). ALL OTHER NON-PLAIN FILM IMAGES SUCH AS CT, ULTRASOUND AND MRI HAVE BEEN READ BY THE RADIOLOGIST. No orders to display          EKG INTERPRETATION  None    PROCEDURES    ED COURSE/MDM  Viral syndrome, COVID-19, URI, UTI, STD, urethritis, vaginitis  Patient seen and evaluated. History and physical as above. Nontoxic, afebrile. Patient presents for complaints of diarrhea, general body aches and chills. Patient was concerned for possible COVID-19 because she had a recent exposure. Patient without a tachycardia or tachypnea. O2 saturation 98% on room air. Will obtain COVID swab. Patient also requesting testing for STD. No complaints for any STD symptoms. No abdominal tenderness on exam.  Patient denies any vaginal bleeding or vaginal discharge. Offered pelvic exam but patient elected to self swab. ED Course as of 07/02/22 2048   Sat Jul 02, 2022 2040 COVID swab negative. Patient's urinalysis negative for infection. There is some blood in the urine. Pregnancy negative. Wet prep does have less than 1 clue cells although patient has no complaints of vaginal discharge or pelvic pain. States she was only wanted to be checked for STDs. This is likely physiologic. No trichomonas or yeast seen. Gonorrhea and chlamydia swabs pending. Patient updated on results. Discussed that she likely has a viral infection causing her diarrhea and body aches. Stressed importance of staying well-hydrated drinking plenty water over the next few days as well as using ibuprofen and Tylenol as needed for body aches and fevers. Also discussed post follow-up with PCP. Patient agreeable. Return instruction provided. All questions answered prior to discharge. Patient discharged home with Zofran, Tylenol and loperamide. [DS]      ED Course User Index  [DS] Sage Park MD       Is this patient to be included in the SEP-1 Core Measure due to severe sepsis or septic shock? No   Exclusion criteria - the patient is NOT to be included for SEP-1 Core Measure due to:  2+ SIRS criteria are not met    The patient was counseled to closely monitor their symptoms, and to return immediately to the Emergency Department for any difficulty breathing, confusion, dizziness or passing out, vomiting, chest pain, high fevers, weakness, or any other new or concerning symptoms. There is no evidence of emergent medical condition at this time, and the patient will be discharged in good condition. Patient was given scripts for the following medications. I counseled patient how to take these medications.    New Prescriptions    ACETAMINOPHEN (TYLENOL) 500 MG TABLET    Take 2 tablets by mouth every 8 hours as needed for Fever    LOPERAMIDE (RA ANTI-DIARRHEAL) 2 MG CAPSULE    Take 1 capsule by mouth 4 times daily as needed for Diarrhea    ONDANSETRON (ZOFRAN ODT) 4 MG DISINTEGRATING TABLET    Take 1 tablet by mouth every 8 hours as needed for Nausea           CLINICAL IMPRESSION  1. Concern about STD in female without diagnosis    2. Diarrhea, unspecified type    3. Body aches        Blood pressure (!) 133/90, pulse 97, temperature 99.4 °F (37.4 °C), temperature source Oral, resp. rate 18, height 5' 3\" (1.6 m), weight 155 lb 3.3 oz (70.4 kg), SpO2 98 %. DISPOSITION  Patient was discharged to home in good condition. [address] [place] [address] [facility] [address] [place] [address]  [phone]    Call today  For a follow up appointment. Disclaimer: All medical record entries made by [address] [place] [address] dictation.       (Please note that this note was completed with a voice recognition program. Every attempt was made to edit the dictations, but inevitably there remain words that are mis-transcribed.)            Corrine Haines MD  07/02/22 7962

## 2022-07-06 LAB
C TRACH DNA GENITAL QL NAA+PROBE: POSITIVE
N. GONORRHOEAE DNA: NEGATIVE

## 2022-07-06 NOTE — RESULT ENCOUNTER NOTE
Patient's positive result has been appropriately evaluated by the provider pool. Patient was unable to be reached over the phone. A voicemail was left with the patient. Will await a return phone call. Will try to reach patient again tomorrow per protocol. NOTE:  HER PHONE NUMBER LISTED WAS INCORRECT. PLEASE CALL [phone].

## 2022-07-06 NOTE — RESULT ENCOUNTER NOTE
Patient's positive result has been appropriately evaluated by the provider pool. Patient was contacted and notified of the results. Rx called to [facility] on [address]. [phone].

## 2022-07-06 NOTE — RESULT ENCOUNTER NOTE
Culture reviewed, Culture is positive for chlamydia. Patient was not placed on any antibiotics at discharge. They should be notified of the test results and placed on doxycycline 100 mg to take 1 tablet twice a day for 7 days, #14. Please call this prescription to the pharmacy of their choice. Please notify the patient that her partner must be treated to prevent any future re-infections.

## 2022-08-28 ENCOUNTER — HOSPITAL ENCOUNTER (EMERGENCY)
Age: 22
Discharge: HOME | End: 2022-08-29
Attending: EMERGENCY MEDICINE
Payer: COMMERCIAL

## 2022-08-28 VITALS
DIASTOLIC BLOOD PRESSURE: 88 MMHG | BODY MASS INDEX: 27.34 KG/M2 | SYSTOLIC BLOOD PRESSURE: 129 MMHG | WEIGHT: 154.32 LBS | HEART RATE: 88 BPM | HEIGHT: 63 IN | OXYGEN SATURATION: 100 % | RESPIRATION RATE: 16 BRPM | TEMPERATURE: 97.8 F

## 2022-08-28 DIAGNOSIS — B96.89 BACTERIAL VAGINOSIS: Primary | ICD-10-CM

## 2022-08-28 DIAGNOSIS — N76.0 BACTERIAL VAGINOSIS: Primary | ICD-10-CM

## 2022-08-28 LAB
BACTERIA WET PREP: ABNORMAL
BACTERIA: ABNORMAL /HPF
BILIRUBIN URINE: NEGATIVE
BLOOD, URINE: ABNORMAL
CLARITY: CLEAR
CLUE CELLS: ABNORMAL
COLOR: YELLOW
EPITHELIAL CELLS WET PREP: ABNORMAL
EPITHELIAL CELLS, UA: ABNORMAL /HPF (ref 0–5)
GLUCOSE URINE: NEGATIVE MG/DL
HCG(URINE) PREGNANCY TEST: NEGATIVE
KETONES, URINE: NEGATIVE MG/DL
LEUKOCYTE ESTERASE, URINE: NEGATIVE
MICROSCOPIC EXAMINATION: YES
NITRITE, URINE: NEGATIVE
PH UA: 6 (ref 5–8)
PROTEIN UA: NEGATIVE MG/DL
RBC UA: ABNORMAL /HPF (ref 0–4)
RBC WET PREP: ABNORMAL
SOURCE WET PREP: ABNORMAL
SPECIFIC GRAVITY UA: 1.02 (ref 1–1.03)
TRICHOMONAS PREP: ABNORMAL
URINE REFLEX TO CULTURE: ABNORMAL
URINE TYPE: ABNORMAL
UROBILINOGEN, URINE: 0.2 E.U./DL
WBC UA: ABNORMAL /HPF (ref 0–5)
WBC WET PREP: ABNORMAL
YEAST WET PREP: ABNORMAL

## 2022-08-28 PROCEDURE — 99283 EMERGENCY DEPT VISIT LOW MDM: CPT

## 2022-08-28 PROCEDURE — 87210 SMEAR WET MOUNT SALINE/INK: CPT

## 2022-08-28 PROCEDURE — 87491 CHLMYD TRACH DNA AMP PROBE: CPT

## 2022-08-28 PROCEDURE — 84703 CHORIONIC GONADOTROPIN ASSAY: CPT

## 2022-08-28 PROCEDURE — 87591 N.GONORRHOEAE DNA AMP PROB: CPT

## 2022-08-28 PROCEDURE — 81001 URINALYSIS AUTO W/SCOPE: CPT

## 2022-08-28 RX ORDER — METRONIDAZOLE 500 MG/1
500 TABLET ORAL 2 TIMES DAILY
Qty: 14 TABLET | Refills: 0 | Status: SHIPPED | OUTPATIENT
Start: 2022-08-28 | End: 2022-09-04

## 2022-08-28 ASSESSMENT — PAIN - FUNCTIONAL ASSESSMENT
PAIN_FUNCTIONAL_ASSESSMENT: NONE - DENIES PAIN
PAIN_FUNCTIONAL_ASSESSMENT: NONE - DENIES PAIN

## 2022-08-28 NOTE — LETTER
August 31, 2022    Shaheed Calvin [address]  Derrick Wilder      Dear Ms. Shaheed Tillman,    During your Emergency Department visit on 8/28/2022, radiology and/or lab tests were taken and sent for analysis. The Emergency Department physician has reviewed the results and would like to discuss the findings with you. As of this time, attempts to reach you have been unsuccessful. Please contact the Emergency Department at ([phone]) *** and ask to speak to the Charge Nurse regarding your results and the possible need for further treatment.     Sincerely,     Dr. Eva GARZON [phone]

## 2022-08-29 NOTE — ED PROVIDER NOTES
CHIEF COMPLAINT  Vaginal discharge      HISTORY OF PRESENT ILLNESS  Tanisha Westbrook is a 19 y.o. female who  has a past medical history of ESBL (extended spectrum beta-lactamase) producing bacteria infection and Kidney stone. presents to the ED complaining of vaginal discharge that is present over the past 4 days. Patient also states she is having some suprapubic cramping. Denies any vaginal bleeding. Denies any upper abdominal pain. No unilateral abdominal pain. No fevers or chills. No nausea or vomiting. States she is having normal urinary habits. Normal bowel movements. Denies any concern for STDs. No other complaints, modifying factors or associated symptoms. Pt was seen during the [employer] 19 pandemic. Appropriate PPE worn by ME during patient encounters. Patient was cared for during a time with constrained hospital bed capacity with nationwide stress on resources and staffing. Nursing notes reviewed.    Past Medical History:   Diagnosis Date    ESBL (extended spectrum beta-lactamase) producing bacteria infection 01/05/2020    urine    Kidney stone      Denies any past surgical history  Denies any pertinent family history  Social History     Socioeconomic History    Marital status: Single     Spouse name: Not on file    Number of children: Not on file    Years of education: Not on file    Highest education level: Not on file   Occupational History    Not on file   Tobacco Use    Smoking status: Never    Smokeless tobacco: Never   Vaping Use    Vaping Use: Never used   Substance and Sexual Activity    Alcohol use: No    Drug use: Not Currently     Types: Marijuana Juanetta Appleton)    Sexual activity: Not on file   Other Topics Concern    Not on file   Social History Narrative    Not on file     Social Determinants of Health     Financial Resource Strain: Not on file   Food Insecurity: Not on file   Transportation Needs: Not on file   Physical Activity: Not on file   Stress: Not on file   Social Connections: Not on file   Intimate Partner Violence: Not on file   Housing Stability: Not on file     No current facility-administered medications for this encounter. Current Outpatient Medications   Medication Sig Dispense Refill    metroNIDAZOLE (FLAGYL) 500 MG tablet Take 1 tablet by mouth 2 times daily for 7 days 14 tablet 0     Allergies   Allergen Reactions    Ritalin [Methylphenidate] Shortness Of Breath    Oxycodone      Difficulty breathing and itching  Difficulty breathing and itching           REVIEW OF SYSTEMS  (up to 7 for level 4, 8 or more for level 5) pertinent positives per HPI otherwise noted to be negative    PHYSICAL EXAM  /88   Pulse 88   Temp 97.8 °F (36.6 °C) (Oral)   Resp 16   Ht 5' 3\" (1.6 m)   Wt 154 lb 5.2 oz (70 kg)   SpO2 100%   BMI 27.34 kg/m²      CONSTITUTIONAL: AOx4, cooperative with exam, afebrile   HEAD: normocephalic, atraumatic   EYES: PERRL, EOMI, anicteric sclera   ENT: Moist mucous membranes   LUNGS: Bilateral breath sounds, CTAB, no rales/ronchi/wheezes   CARDIOVASCULAR: RRR, normal S1/S2, no m/r/g, 2+ pulses throughout   ABDOMEN: Soft, non-tender, non-distended, +BS   NEUROLOGIC:  MAEx4, GCS 15   MUSCULOSKELETAL: No clubbing, cyanosis or edema   SKIN: No rash, pallor or wounds on exposed surfaces     Patient deferred pelvic exam    RADIOLOGY  X-RAYS:  I have reviewed radiologic plain film image(s). ALL OTHER NON-PLAIN FILM IMAGES SUCH AS CT, ULTRASOUND AND MRI HAVE BEEN READ BY THE RADIOLOGIST. No orders to display          EKG INTERPRETATION  None    PROCEDURES    ED COURSE/MDM  Urethritis, UTI, STD, vaginitis  Patient seen and evaluated. History and physical as above. Nontoxic, afebrile. Patient presents complaining of vaginal discharge with some suprapubic cramping. No peritoneal signs on abdominal exam.  No abdominal tenderness at all. Offered pelvic exam but patient deferred. Patient chose to self swab.   Plan for urinalysis, urine pregnancy, gonorrhea chlamydia testing and wet prep. ED Course as of 08/29/22 0112   Karyn Villarreal Aug 28, 2022   2202 Urinalysis negative for infection. There is some blood in the urine. Urine pregnancy negative. Wet prep does have 2+ clue cells consistent with BV. Patient updated on results. Discussed discharge with outpatient follow-up with Flagyl for BV. Patient agreeable. Patient electing to follow-up results for gonorrhea chlamydia testing. Stressed importance of compliance with Flagyl as well as not drinking alcohol while taking this medication. Return instruction provided. All questions answered prior to discharge. [DS]      ED Course User Index  [DS] Pippa Dbuois MD       Is this patient to be included in the SEP-1 Core Measure due to severe sepsis or septic shock? No   Exclusion criteria - the patient is NOT to be included for SEP-1 Core Measure due to:  2+ SIRS criteria are not met      I estimate there is LOW risk for ACUTE APPENDICITIS, BOWEL OBSTRUCTION, CHOLECYSTITIS, DIVERTICULITIS, INCARCERATED HERNIA, PANCREATITIS, PELVIC INFLAMMATORY DISEASE, PERFORATED BOWEL or ULCER, PREGNANCY, or TUBO-OVARIAN ABSCESS, thus I consider the discharge disposition reasonable. Also, there is no evidence or peritonitis, sepsis, or toxicity. Vinnie Bustamante and I have discussed the diagnosis and risks, and we agree with discharging home to follow-up with their primary doctor. We also discussed returning to the Emergency Department immediately if new or worsening symptoms occur. We have discussed the symptoms which are most concerning (e.g., bloody stool, fever, changing or worsening pain, vomiting) that necessitate immediate return. Patient was given scripts for the following medications. I counseled patient how to take these medications.    Discharge Medication List as of 8/28/2022 10:30 PM        START taking these medications    Details   metroNIDAZOLE (FLAGYL) 500 MG tablet Take 1 tablet by mouth 2 times daily for 7 days, Disp-14 tablet, R-0Normal                 CLINICAL IMPRESSION  1. Bacterial vaginosis        Blood pressure 129/88, pulse 88, temperature 97.8 °F (36.6 °C), temperature source Oral, resp. rate 16, height 5' 3\" (1.6 m), weight 154 lb 5.2 oz (70 kg), SpO2 100 %. DISPOSITION  Patient was discharged to home in good condition. Gabriela Bee APRN - CNP  [facility] [address] [place] [address]  [phone]    Call in 1 day  For a follow up appointment. Disclaimer: All medical record entries made by [address] [place] [address].       (Please note that this note was completed with a voice recognition program. Every attempt was made to edit the dictations, but inevitably there remain words that are mis-transcribed.)            Darryle Fuller, MD  08/29/22 0677

## 2022-08-29 NOTE — ED NOTES
Pt comes in with c/o vaginal discharge x 4 days and suprapubic cramping. Denies any fevers. Denies n/v. Concern for STIs.      Diane Andrews RN  08/28/22 3253

## 2022-08-29 NOTE — DISCHARGE INSTRUCTIONS
Call today or tomorrow to follow up with MACO Ramirez CNP  in 4-5 days. Take your medication as indicated, if you are given an antibiotic then make sure you get the prescription filled and take the antibiotics until finished. Do not drink alcohol while taking the Flagyl medication. Drink plenty of water while taking the antibiotics. Avoid drinking alcohol while taking antibiotics. Lupe Truong has some antibiotics for free; Camilo Baig has a 4 dollar prescription plan for some antibiotics. Return to the Emergency Department for fever > 101.5, inability to urinate, burning when you urinate, increase in the number of times or if you have an urgency to urinate, any other care or concern.

## 2022-08-29 NOTE — ED NOTES
Explained self swab procedure to pt. Detailed handout given to explain procedure as well. Pt performed self swab procedure for specimens with supervision of RN. Specimens to lab. Tolerated well.      Maggy Langley RN  08/28/22 6135

## 2022-08-30 LAB
C TRACH DNA GENITAL QL NAA+PROBE: POSITIVE
N. GONORRHOEAE DNA: NEGATIVE

## 2022-08-30 NOTE — RESULT ENCOUNTER NOTE
Culture reviewed, please contact patient and inform them of the results and advised return to the emergency department for treatment or may prescribe Suprax 400 mg p.o. x1 and doxycycline 100 mg p.o. twice daily for 7 days, #14, no refills. ask them to abstain from all sexual activity until symptoms resolve and all partners have been treated. Advise health department follow-up for HIV and syphilis testing.

## 2022-08-31 NOTE — RESULT ENCOUNTER NOTE
Unsuccessful attempts to reach patient by telephone. Certified letter sent to patient's address on file.  The phone number listed is a place of business so I sent a certified letter

## 2022-09-06 NOTE — ED NOTES
9/6/2022 1744  Patient returned call for letter received. Prescription x2 called into to [facility] on [address].   Left message on [address] [facility], RN  09/06/22 9744
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
## 2022-11-17 ENCOUNTER — APPOINTMENT (OUTPATIENT)
Dept: GENERAL RADIOLOGY | Age: 22
End: 2022-11-17
Payer: COMMERCIAL

## 2022-11-17 ENCOUNTER — HOSPITAL ENCOUNTER (EMERGENCY)
Age: 22
Discharge: HOME | End: 2022-11-17
Attending: EMERGENCY MEDICINE
Payer: COMMERCIAL

## 2022-11-17 VITALS
OXYGEN SATURATION: 95 % | HEART RATE: 86 BPM | RESPIRATION RATE: 16 BRPM | SYSTOLIC BLOOD PRESSURE: 125 MMHG | TEMPERATURE: 98.2 F | DIASTOLIC BLOOD PRESSURE: 90 MMHG

## 2022-11-17 DIAGNOSIS — J40 BRONCHITIS: Primary | ICD-10-CM

## 2022-11-17 LAB
RAPID INFLUENZA  B AGN: NEGATIVE
RAPID INFLUENZA A AGN: NEGATIVE

## 2022-11-17 PROCEDURE — 87804 INFLUENZA ASSAY W/OPTIC: CPT

## 2022-11-17 PROCEDURE — 99284 EMERGENCY DEPT VISIT MOD MDM: CPT

## 2022-11-17 PROCEDURE — U0005 INFEC AGEN DETEC AMPLI PROBE: HCPCS

## 2022-11-17 PROCEDURE — U0003 INFECTIOUS AGENT DETECTION BY NUCLEIC ACID (DNA OR RNA); SEVERE ACUTE RESPIRATORY SYNDROME CORONAVIRUS 2 (SARS-COV-2) (CORONAVIRUS DISEASE [COVID-19]), AMPLIFIED PROBE TECHNIQUE, MAKING USE OF HIGH THROUGHPUT TECHNOLOGIES AS DESCRIBED BY CMS-2020-01-R: HCPCS

## 2022-11-17 PROCEDURE — 71046 X-RAY EXAM CHEST 2 VIEWS: CPT

## 2022-11-17 RX ORDER — PREDNISONE 20 MG/1
20 TABLET ORAL 2 TIMES DAILY
Qty: 10 TABLET | Refills: 0 | Status: SHIPPED | OUTPATIENT
Start: 2022-11-17 | End: 2022-11-22

## 2022-11-17 RX ORDER — GUAIFENESIN/DEXTROMETHORPHAN 100-10MG/5
5 SYRUP ORAL 3 TIMES DAILY PRN
Qty: 120 ML | Refills: 0 | Status: SHIPPED | OUTPATIENT
Start: 2022-11-17 | End: 2022-11-27

## 2022-11-17 ASSESSMENT — ENCOUNTER SYMPTOMS
ABDOMINAL PAIN: 0
NAUSEA: 0
COUGH: 1
GASTROINTESTINAL NEGATIVE: 1
VOMITING: 0
SHORTNESS OF BREATH: 0
SORE THROAT: 0

## 2022-11-18 LAB — SARS-COV-2: NOT DETECTED

## 2022-11-18 NOTE — ED PROVIDER NOTES
[address] ENCOUNTER      Pt Name: Bob Huggins  MRN: [id number]  [place] 2000  Date ofevaluation: 11/17/2022  Provider: Pippa Means MD    CHIEF COMPLAINT       Chief Complaint   Patient presents with    Cough     X3 weeks worsening the past few days             HISTORY OF PRESENT ILLNESS   (Location/Symptom, Timing/Onset,Context/Setting, Quality, Duration, Modifying Factors, Severity)  Note limiting factors. Bob Huggins is a 23 y.o. female  who  has a past medical history of ESBL (extended spectrum beta-lactamase) producing bacteria infection and Kidney stone. who presents to the emergency department    57-year-old female presents for gradual onset constant worsening cough which been going on for 3 weeks last over the last few days. Productive of yellow-green sputum. Associated with mild chills. Chest pain just with coughing now with deep breathing now with exertion. No measured fevers. No myalgias. No other associated symptoms. No other known sick contacts. No other risk factors. No other modifying factors. Patient is taking over-the-counter cough medicine with no relief. Symptoms are mild in nature    The history is provided by the patient. No  was used. NursingNotes were reviewed. REVIEW OF SYSTEMS    (2-9 systems for level 4, 10 or more for level 5)     Review of Systems   Constitutional:  Positive for chills. Negative for fever. HENT:  Negative for congestion and sore throat. Respiratory:  Positive for cough. Negative for shortness of breath. Cardiovascular:  Positive for chest pain. Gastrointestinal: Negative. Negative for abdominal pain, nausea and vomiting. Genitourinary: Negative. Musculoskeletal: Negative. Negative for myalgias. Neurological: Negative. Negative for headaches. Except as noted above the remainder of the review of systems was reviewed and negative. PAST MEDICAL HISTORY     Past Medical History:   Diagnosis Date    ESBL (extended spectrum beta-lactamase) producing bacteria infection 01/05/2020    urine    Kidney stone          SURGICALHISTORY     No past surgical history on file. CURRENT MEDICATIONS       Previous Medications    No medications on file            Ritalin [methylphenidate] and Oxycodone    FAMILY HISTORY     No family history on file. SOCIAL HISTORY       Social History     Socioeconomic History    Marital status: Single   Tobacco Use    Smoking status: Never    Smokeless tobacco: Never   Vaping Use    Vaping Use: Never used   Substance and Sexual Activity    Alcohol use: No    Drug use: Not Currently     Types: Marijuana (Weed)       SCREENINGS    [place] Coma Scale  Eye Opening: Spontaneous  Best Verbal Response: Oriented  Best Motor Response: Obeys commands  [place] Coma Scale Score: 15        PHYSICAL EXAM    (up to 7 for level 4, 8 or more for level 5)     ED Triage Vitals [11/17/22 2014]   BP Temp Temp Source Heart Rate Resp SpO2 Height Weight   (!) 125/90 98.2 °F (36.8 °C) Oral 86 16 95 % -- --       Physical Exam  Vitals and nursing note reviewed. Constitutional:       General: She is not in acute distress. Appearance: Normal appearance. She is not ill-appearing, toxic-appearing or diaphoretic. HENT:      Head: Normocephalic and atraumatic. Jaw: No trismus or swelling. Right Ear: Tympanic membrane, ear canal and external ear normal. No drainage, swelling or tenderness. No middle ear effusion. There is no impacted cerumen. No foreign body. Tympanic membrane is not erythematous, retracted or bulging. Left Ear: Tympanic membrane, ear canal and external ear normal. No drainage, swelling or tenderness. No middle ear effusion. There is no impacted cerumen. No foreign body. Tympanic membrane is not erythematous, retracted or bulging. Nose: Nose normal. No congestion.       Mouth/Throat:      Mouth: Mucous membranes are moist.      Tongue: No lesions. Tongue does not deviate from midline. Palate: No mass and lesions. Pharynx: Oropharynx is clear. Uvula midline. No pharyngeal swelling, oropharyngeal exudate, posterior oropharyngeal erythema or uvula swelling. Tonsils: No tonsillar exudate or tonsillar abscesses. Eyes:      Extraocular Movements: Extraocular movements intact. Pupils: Pupils are equal, round, and reactive to light. Cardiovascular:      Rate and Rhythm: Normal rate and regular rhythm. Pulses: Normal pulses. Heart sounds: Normal heart sounds. Pulmonary:      Effort: Pulmonary effort is normal. No respiratory distress. Breath sounds: Normal breath sounds. No stridor. No wheezing, rhonchi or rales. Chest:      Chest wall: No tenderness. Abdominal:      General: Abdomen is flat. Bowel sounds are normal.      Palpations: Abdomen is soft. Tenderness: There is no abdominal tenderness. Musculoskeletal:      Cervical back: Normal range of motion and neck supple. No rigidity. Right lower leg: No edema. Left lower leg: No edema. Lymphadenopathy:      Cervical: No cervical adenopathy. Skin:     General: Skin is warm and dry. Capillary Refill: Capillary refill takes less than 2 seconds. Neurological:      General: No focal deficit present. Mental Status: She is alert and oriented to person, place, and time. Psychiatric:         Mood and Affect: Mood normal.         Behavior: Behavior normal.       RESULTS     EKG: All EKG's are interpreted by the Emergency Department Physician who either signs or Co-signs this chart in the absence of a cardiologist.      RADIOLOGY:   Non-plain filmimages such as CT, Ultrasound and MRI are read by the radiologist.  All images reviewed by the emergency department physician who either signs or cosigns this chart.  Interpretation per the Radiologist below, if available at the time ofthis note:    XR CHEST (2 VW)   Final Result   No radiographic evidence of an acute cardiopulmonary process. ED BEDSIDE ULTRASOUND:   Performed by ED Physician - none    LABS:  Labs Reviewed   RAPID INFLUENZA A/B ANTIGENS   COVID-19   COVID-19       All other labs were within normal range or not returned as of this dictation. EMERGENCY DEPARTMENT COURSE and DIFFERENTIAL DIAGNOSIS/MDM:   Vitals:    Vitals:    11/17/22 2014   BP: (!) 125/90   Pulse: 86   Resp: 16   Temp: 98.2 °F (36.8 °C)   TempSrc: Oral   SpO2: 95%       Patient was given thefollowing medications:  Medications - No data to display    ED COURSE & MEDICAL DECISION MAKING    Pertinent Labs & Imaging studies reviewed. (See chart for details)   -  Patient seen and evaluated in the emergency department. -  Triage and nursing notes reviewed and incorporated. -  Old chart records reviewed and incorporated. -  Differential diagnosis includes: Differential diagnoses: COVID 19, Influenza, Other viral illness, Meningitis, Group A strep, Airway Obstruction, Pneumonia, Hypoxemia, Dehydration, other. 68-year-old female presents for persistent cough for 3 weeks concerning for possible bronchitis caused by likely viral infection however will obtain chest x-ray to rule out pneumonia. Patient shows no signs of airway obstruction strep meningitis  hypoxemia. Does not appear significantly dehydratedThat she is afebrile not tachycardic saturating well on room air. Will swab for COVID and flu as well as obtain chest x-ray and disposition accordingly. -  Work-up included:  See above  -  ED treatment included: See above  -  Results discussed with patient. The patient is agreeable with plan of care and disposition. Is this patient to be included in the SEP-1 Core Measure due to severe sepsis or septic shock? No   Exclusion criteria - the patient is NOT to be included for SEP-1 Core Measure due to:   Infection is not suspected   No   REASSESSMENT     ED Course as of 11/17/22 2135   Thu Nov 17, 2022 2134 Chest x-ray unremarkable. Flu is negative. COVID will result tomorrow. Will give patient steroids for likely bronchitis as well as cough suppressant [SC]      ED Course User Index  [SC] Ella Callahan MD     I completed a structured, evidence-based clinical evaluation to determine the need for antibiotics in this patient with clinical  signs and symptoms of acute bronchitis. The evidence indicates that the patient is very low risk for infectious process  requiring antibiotics. Antibiotics have significant risks, and in this case, the risks of antibiotics or hospitalization for this issue likely exceed the  benefit. It is, therefore, in the patients best interest not to take antibiotics or to be hospitalized for this issue at this time. The patient is at low risk for mortality based on demographic, history and clinical factors. Given the best available information and clinical assessment, I estimate the risk of hospitalization to be greater than risk of treatment at home. I have explained to the patient that the risk could rapidly change, given precautions for return and instructions. Explained to patient that the risk for mortality is low based on demographic, history and clinical factors. I discussed with patient the results of evaluation in the ED, diagnosis, care, and prognosis. The plan is to discharge to home. Patient is in agreement with plan and questions have been answered. I also discussed with patient the reasons which may require a return visit and the importance of follow-up care. The patient is well-appearing, nontoxic, and improved at the time of discharge. Patient agrees to call to arrange follow-up care as directed. Patient understands to return immediately for worsening/change in symptoms. CRITICAL CARE TIME   Total Critical Care time was 0 minutes, excluding separately reportable procedures.   There was a high probability of clinically significant/life threatening deterioration in the patient's condition which required my urgent intervention. This includes multiple reevaluations, vital sign monitoring, pulse oximetry monitoring, telemetry monitoring, clinical response to the IV medications, reviewing the nursing notes, consultation time, dictation/documentation time, and interpretation of the labwork. (This time excludes time spent performing procedures). CONSULTS:  None    PROCEDURES:  Unless otherwise noted below, none     Procedures    FINAL IMPRESSION      1.  Bronchitis          DISPOSITION/PLAN   DISPOSITION Decision To Discharge 11/17/2022 09:34:22 PM      PATIENT REFERREDTO:  MACO Taylor - AN  [place] [phone]    Schedule an appointment as soon as possible for a visit       DISCHARGEMEDICATIONS:  New Prescriptions    GUAIFENESIN-DEXTROMETHORPHAN (ROBITUSSIN DM) 100-10 MG/5ML SYRUP    Take 5 mLs by mouth 3 times daily as needed for Cough    PREDNISONE (DELTASONE) 20 MG TABLET    Take 1 tablet by mouth 2 times daily for 5 days          (Please note that portions of this note were completed with a voice recognition program.  Efforts were made to edit the dictations but occasionally words are mis-transcribed.)    Carly Bruno MD (electronically signed)  Attending Emergency Physician          Carly Bruno MD  11/17/22 7938

## 2022-11-18 NOTE — ED NOTES
Pt discharged at this time. Discharge instructions and medications reviewed,  Questions were answered. PT verbalized understanding. VSS, Afebrile. Follow up appointments were discussed.          Noman John RN  11/17/22 2222

## 2022-11-18 NOTE — ED NOTES
Patient reports a cough x 3 weeks. Worsening the past few days, also states that it does get bad a night as well. Afebrile.       Ashwini Mae RN  11/17/22 2016

## 2023-01-18 ENCOUNTER — HOSPITAL ENCOUNTER (EMERGENCY)
Age: 23
Discharge: HOME | End: 2023-01-18
Payer: COMMERCIAL

## 2023-01-18 VITALS
HEART RATE: 60 BPM | BODY MASS INDEX: 29.41 KG/M2 | WEIGHT: 166.01 LBS | DIASTOLIC BLOOD PRESSURE: 98 MMHG | HEIGHT: 63 IN | SYSTOLIC BLOOD PRESSURE: 140 MMHG | OXYGEN SATURATION: 92 % | TEMPERATURE: 98.1 F | RESPIRATION RATE: 20 BRPM

## 2023-01-18 DIAGNOSIS — K62.5 RECTAL BLEEDING: Primary | ICD-10-CM

## 2023-01-18 LAB
ANION GAP SERPL CALCULATED.3IONS-SCNC: 10 MMOL/L (ref 3–16)
BASOPHILS ABSOLUTE: 0.1 K/UL (ref 0–0.2)
BASOPHILS RELATIVE PERCENT: 0.7 %
BUN BLDV-MCNC: 10 MG/DL (ref 7–20)
CALCIUM SERPL-MCNC: 10.2 MG/DL (ref 8.3–10.6)
CHLORIDE BLD-SCNC: 105 MMOL/L (ref 99–110)
CO2: 23 MMOL/L (ref 21–32)
CREAT SERPL-MCNC: 0.6 MG/DL (ref 0.6–1.1)
EOSINOPHILS ABSOLUTE: 0.1 K/UL (ref 0–0.6)
EOSINOPHILS RELATIVE PERCENT: 1.5 %
GFR SERPL CREATININE-BSD FRML MDRD: >60 ML/MIN/{1.73_M2}
GLUCOSE BLD-MCNC: 91 MG/DL (ref 70–99)
HCT VFR BLD CALC: 37.8 % (ref 36–48)
HEMOGLOBIN: 12.8 G/DL (ref 12–16)
LYMPHOCYTES ABSOLUTE: 2.6 K/UL (ref 1–5.1)
LYMPHOCYTES RELATIVE PERCENT: 28.1 %
MCH RBC QN AUTO: 31.1 PG (ref 26–34)
MCHC RBC AUTO-ENTMCNC: 33.9 G/DL (ref 31–36)
MCV RBC AUTO: 91.6 FL (ref 80–100)
MONOCYTES ABSOLUTE: 0.6 K/UL (ref 0–1.3)
MONOCYTES RELATIVE PERCENT: 6.5 %
NEUTROPHILS ABSOLUTE: 5.9 K/UL (ref 1.7–7.7)
NEUTROPHILS RELATIVE PERCENT: 63.2 %
OCCULT BLOOD SCREENING: ABNORMAL
PDW BLD-RTO: 13.7 % (ref 12.4–15.4)
PLATELET # BLD: 312 K/UL (ref 135–450)
PMV BLD AUTO: 8.7 FL (ref 5–10.5)
POTASSIUM SERPL-SCNC: 4 MMOL/L (ref 3.5–5.1)
RBC # BLD: 4.12 M/UL (ref 4–5.2)
SODIUM BLD-SCNC: 138 MMOL/L (ref 136–145)
WBC # BLD: 9.4 K/UL (ref 4–11)

## 2023-01-18 PROCEDURE — 36415 COLL VENOUS BLD VENIPUNCTURE: CPT

## 2023-01-18 PROCEDURE — 85025 COMPLETE CBC W/AUTO DIFF WBC: CPT

## 2023-01-18 PROCEDURE — 99283 EMERGENCY DEPT VISIT LOW MDM: CPT

## 2023-01-18 PROCEDURE — 80048 BASIC METABOLIC PNL TOTAL CA: CPT

## 2023-01-18 PROCEDURE — 82270 OCCULT BLOOD FECES: CPT

## 2023-01-18 RX ORDER — HYDROCORTISONE ACETATE 25 MG/1
25 SUPPOSITORY RECTAL 2 TIMES DAILY
Qty: 24 SUPPOSITORY | Refills: 0 | Status: SHIPPED | OUTPATIENT
Start: 2023-01-18

## 2023-01-18 ASSESSMENT — LIFESTYLE VARIABLES: HOW OFTEN DO YOU HAVE A DRINK CONTAINING ALCOHOL: NEVER

## 2023-01-18 ASSESSMENT — PAIN - FUNCTIONAL ASSESSMENT: PAIN_FUNCTIONAL_ASSESSMENT: NONE - DENIES PAIN

## 2023-01-18 NOTE — DISCHARGE INSTRUCTIONS
Take the prescribed Anusol as directed. Increase your fluid intake. Follow-up with your PCP as well as with listed GI specialist as directed. Return for high fever, incessant vomiting, severe pain, uncontrolled bleeding, any other worsening symptoms.

## 2023-01-18 NOTE — ED NOTES
Pt d/c home with AVS no s.s of distress noted script x 1 in hand she denies questions , family at [address], [place]  01/18/23 1311

## 2023-01-18 NOTE — ED PROVIDER NOTES
[address] ENCOUNTER        Pt Name: Giovanna Josue  MRN: [id number]  [place] 2000  Date of evaluation: 1/18/2023  Provider: MACO Lo CNP  PCP: MACO Almanzar CNP  Note Started: 3:11 PM EST 1/18/23      {Shared Not Shared:93391}      CHIEF COMPLAINT       Chief Complaint   Patient presents with    Rectal Bleeding     Pt states that she had two episodes this morning she states that this happened in the past , but never twice in a room, she denies abd pain, denies nausea , denies vomiting        HISTORY OF PRESENT ILLNESS: 1 or more Elements     History From: ***  {Limitations to history (Optional):38448}    Giovanna Josue is a 23 y.o. female who presents ***    Nursing Notes were all reviewed and agreed with or any disagreements were addressed in the HPI. REVIEW OF SYSTEMS :      Review of Systems    Positives and Pertinent negatives as per HPI. SURGICAL HISTORY   History reviewed. No pertinent surgical history. CURRENTMEDICATIONS       Previous Medications    No medications on file       ALLERGIES     Ritalin [methylphenidate] and Oxycodone    FAMILYHISTORY     History reviewed. No pertinent family history.      SOCIAL HISTORY       Social History     Tobacco Use    Smoking status: Never    Smokeless tobacco: Never   Vaping Use    Vaping Use: Never used   Substance Use Topics    Alcohol use: No    Drug use: Not Currently     Types: Marijuana (Weed)       SCREENINGS        [place] Coma Scale  Eye Opening: Spontaneous  Best Verbal Response: Oriented  Best Motor Response: Obeys commands  [place] Coma Scale Score: 15                CIWA Assessment  BP: (!) 140/98  Heart Rate: 60           PHYSICAL EXAM  1 or more Elements     ED Triage Vitals [01/18/23 1300]   BP Temp Temp Source Heart Rate Resp SpO2 Height Weight   (!) 140/98 98.1 °F (36.7 °C) Oral 60 20 92 % 5' 3\" (1.6 m) 166 lb 0.1 oz (75.3 kg)       Physical Exam    ***    DIAGNOSTIC RESULTS   LABS:    Labs Reviewed   BLOOD OCCULT STOOL SCREEN #1 - Abnormal; Notable for the following components:       Result Value    Occult Blood Screening   (*)     Value: Result: POSITIVE  Normal range: Negative      All other components within normal limits    Narrative:     ORDER#: [id number]                          ORDERED BY: Adriana Asencio  SOURCE: Stool                              COLLECTED:  01/18/23 14:21  ANTIBIOTICS AT [place]ARD[place].:                      RECEIVED :  01/18/23 14:30   CBC WITH AUTO DIFFERENTIAL   BASIC METABOLIC PANEL       When ordered only abnormal lab results are displayed. All other labs were within normal range or not returned as of this dictation. EKG: When ordered, EKG's are interpreted by the Emergency Department Physician in the absence of a cardiologist.  Please see their note for interpretation of EKG. RADIOLOGY:   Non-plain film images such as CT, Ultrasound and MRI are read by the radiologist. Plain radiographic images are visualized and preliminarily interpreted by the ED Provider with the below findings:    ***    Interpretation per the Radiologist below, if available at the time of this note:    No orders to display     No results found. No results found. PROCEDURES   Unless otherwise noted below, none     Procedures    CRITICAL CARE TIME (.cctime)   ***    PAST MEDICAL HISTORY      has a past medical history of ESBL (extended spectrum beta-lactamase) producing bacteria infection (01/05/2020) and Kidney stone.      EMERGENCY DEPARTMENT COURSE and DIFFERENTIAL DIAGNOSIS/MDM:   Vitals:    Vitals:    01/18/23 1300   BP: (!) 140/98   Pulse: 60   Resp: 20   Temp: 98.1 °F (36.7 °C)   TempSrc: Oral   SpO2: 92%   Weight: 166 lb 0.1 oz (75.3 kg)   Height: 5' 3\" (1.6 m)       Patient was given the following medications:  Medications - No data to display          Is this patient to be included in the SEP-1 Core Measure due to severe sepsis or septic shock?   {Rol3GavZeRo:59900}    Chronic Conditions affecting care: ***   has a past medical history of ESBL (extended spectrum beta-lactamase) producing bacteria infection (01/05/2020) and Kidney stone. CONSULTS: (Who and What was discussed)  None  ***    {Social Determinants (Optional):15125::\"None\"}    Records Reviewed (Source): ***    CC/HPI Summary, DDx, ED Course, and Reassessment: ***    Disposition Considerations (tests considered but not done, Admit vs D/C, Shared Decision Making, Pt Expectation of Test or Tx.): ***       I am the Primary Clinician of Record. FINAL IMPRESSION    No diagnosis found. DISPOSITION/PLAN     DISPOSITION        PATIENT REFERRED TO:  No follow-up provider specified.     DISCHARGE MEDICATIONS:  New Prescriptions    No medications on file       DISCONTINUED MEDICATIONS:  Discontinued Medications    No medications on file              (Please note that portions of this note were completed with a voice recognition program.  Efforts were made to edit the dictations but occasionally words are mis-transcribed.)    MACO Otero - CNP (electronically signed) radiologist. Sridevi Guevara radiographic images are visualized and preliminarily interpreted by the ED Provider with the below findings:        Interpretation per the Radiologist below, if available at the time of this note:    No orders to display     No results found. No results found. PROCEDURES   Unless otherwise noted below, none     Procedures    CRITICAL CARE TIME (.cctime)   None    PAST MEDICAL HISTORY      has a past medical history of ESBL (extended spectrum beta-lactamase) producing bacteria infection (01/05/2020) and Kidney stone. EMERGENCY DEPARTMENT COURSE and DIFFERENTIAL DIAGNOSIS/MDM:   Vitals:    Vitals:    01/18/23 1300   BP: (!) 140/98   Pulse: 60   Resp: 20   Temp: 98.1 °F (36.7 °C)   TempSrc: Oral   SpO2: 92%   Weight: 166 lb 0.1 oz (75.3 kg)   Height: 5' 3\" (1.6 m)       Patient was given the following medications:  Medications - No data to display          Is this patient to be included in the SEP-1 Core Measure due to severe sepsis or septic shock? No   Exclusion criteria - the patient is NOT to be included for SEP-1 Core Measure due to: Infection is not suspected    Chronic Conditions affecting care:    has a past medical history of ESBL (extended spectrum beta-lactamase) producing bacteria infection (01/05/2020) and Kidney stone. CONSULTS: (Who and What was discussed)  None          Records Reviewed (Source): Previous records reviewed in order to gain further information regarding patient's PMH as well as her HPI. Nursing notes reviewed. CC/HPI Summary, DDx, ED Course, and Reassessment: This is a 70-year-old otherwise healthy female who presents to the emergency department today after noticing bright red blood in her stool. Patient states that she has had similar symptoms in the past but never twice in 1 day. There is no history of any inflammatory bowel disease. She denies previous abdominal surgeries. She denies abdominal pain. She denies having any back pain.   She denies any urinary symptoms. She is not anticoagulated. She denies known history of any internal or external hemorrhoids. She denies having any pain. Physical exam complete. Patient is nontoxic, afebrile, mildly hypertensive. No signs or symptoms of acute distress noted. Rectal exam is without acute hemorrhage. Stool is guaiac positive. H&H is stable at 12.8 and 37 respectively. White count is normal at 9.4. Again, patient's abdomen is soft and nontender. Advanced imaging such as CT abdomen and pelvis or abdominal x-ray not indicated. Differential diagnoses include ulcerative colitis, bleeding polyp, internal hemorrhoid, external hemorrhoid, anal fissure, diverticulitis    Shared decision making employed and patient is agreeable to discharge with emphasis on close outpatient follow-up with listed GI specialist.  She is encouraged to increase her water intake and to take OTC stool softener to avoid straining as symptoms are most likely related to a internal hemorrhoid. Patient agrees to return for high fever, incessant vomiting, severe pain, any other worsening symptoms. Patient is discharged home in stable condition. I am the Primary Clinician of Record. FINAL IMPRESSION      1.  Rectal bleeding          DISPOSITION/PLAN     DISPOSITION Decision To Discharge 01/18/2023 03:11:44 PM      PATIENT REFERRED TO:  MACO Fernandez - CNP  [facility] [address] [place] [address]  [phone]    In 2 days      Jemma Salgado, [address] [place] [address] [address] [place] [address]  [phone]          DISCHARGE MEDICATIONS:  Discharge Medication List as of 1/18/2023  3:17 PM        START taking these medications    Details   hydrocortisone (ANUSOL-HC) 25 MG suppository Place 1 suppository rectally 2 times daily, Disp-24 suppository, R-0Print             DISCONTINUED MEDICATIONS:  Discharge Medication List as of 1/18/2023  3:17 PM                 (Please note that portions of this note were completed with a voice recognition program.  Efforts were made to edit the dictations but occasionally words are mis-transcribed.)    MACO Coppola CNP (electronically signed)        MACO Coppola CNP  01/25/23 9903

## 2023-01-25 ASSESSMENT — ENCOUNTER SYMPTOMS
SORE THROAT: 0
BLOOD IN STOOL: 1
WHEEZING: 0
BACK PAIN: 0
VOMITING: 0
DIARRHEA: 0
ABDOMINAL PAIN: 0
VOICE CHANGE: 0
CONSTIPATION: 1
CHEST TIGHTNESS: 0
SHORTNESS OF BREATH: 0
NAUSEA: 0
COUGH: 0
ANAL BLEEDING: 1

## 2023-02-22 ENCOUNTER — HOSPITAL ENCOUNTER (EMERGENCY)
Age: 23
Discharge: HOME | End: 2023-02-22
Attending: EMERGENCY MEDICINE
Payer: COMMERCIAL

## 2023-02-22 ENCOUNTER — APPOINTMENT (OUTPATIENT)
Dept: CT IMAGING | Age: 23
End: 2023-02-22
Payer: COMMERCIAL

## 2023-02-22 VITALS
WEIGHT: 164.02 LBS | RESPIRATION RATE: 14 BRPM | SYSTOLIC BLOOD PRESSURE: 124 MMHG | BODY MASS INDEX: 29.06 KG/M2 | OXYGEN SATURATION: 97 % | HEART RATE: 60 BPM | HEIGHT: 63 IN | DIASTOLIC BLOOD PRESSURE: 77 MMHG | TEMPERATURE: 98.4 F

## 2023-02-22 DIAGNOSIS — N20.1 RIGHT URETERAL STONE: Primary | ICD-10-CM

## 2023-02-22 LAB
A/G RATIO: 1.5 (ref 1.1–2.2)
ALBUMIN SERPL-MCNC: 4.1 G/DL (ref 3.4–5)
ALP BLD-CCNC: 85 U/L (ref 40–129)
ALT SERPL-CCNC: 14 U/L (ref 10–40)
ANION GAP SERPL CALCULATED.3IONS-SCNC: 11 MMOL/L (ref 3–16)
AST SERPL-CCNC: 13 U/L (ref 15–37)
BACTERIA: ABNORMAL /HPF
BASOPHILS ABSOLUTE: 0.1 K/UL (ref 0–0.2)
BASOPHILS RELATIVE PERCENT: 0.8 %
BILIRUB SERPL-MCNC: <0.2 MG/DL (ref 0–1)
BILIRUBIN URINE: NEGATIVE
BLOOD, URINE: ABNORMAL
BUN BLDV-MCNC: 10 MG/DL (ref 7–20)
CALCIUM SERPL-MCNC: 9.8 MG/DL (ref 8.3–10.6)
CHLORIDE BLD-SCNC: 108 MMOL/L (ref 99–110)
CLARITY: CLEAR
CO2: 22 MMOL/L (ref 21–32)
COLOR: YELLOW
CREAT SERPL-MCNC: 0.8 MG/DL (ref 0.6–1.1)
EOSINOPHILS ABSOLUTE: 0.2 K/UL (ref 0–0.6)
EOSINOPHILS RELATIVE PERCENT: 2.3 %
EPITHELIAL CELLS, UA: ABNORMAL /HPF (ref 0–5)
GFR SERPL CREATININE-BSD FRML MDRD: >60 ML/MIN/{1.73_M2}
GLUCOSE BLD-MCNC: 112 MG/DL (ref 70–99)
GLUCOSE URINE: NEGATIVE MG/DL
HCG QUALITATIVE: NEGATIVE
HCT VFR BLD CALC: 38.4 % (ref 36–48)
HEMOGLOBIN: 12.8 G/DL (ref 12–16)
KETONES, URINE: NEGATIVE MG/DL
LEUKOCYTE ESTERASE, URINE: NEGATIVE
LYMPHOCYTES ABSOLUTE: 4.8 K/UL (ref 1–5.1)
LYMPHOCYTES RELATIVE PERCENT: 45.8 %
MCH RBC QN AUTO: 30.7 PG (ref 26–34)
MCHC RBC AUTO-ENTMCNC: 33.3 G/DL (ref 31–36)
MCV RBC AUTO: 92 FL (ref 80–100)
MICROSCOPIC EXAMINATION: YES
MONOCYTES ABSOLUTE: 0.8 K/UL (ref 0–1.3)
MONOCYTES RELATIVE PERCENT: 7.8 %
NEUTROPHILS ABSOLUTE: 4.6 K/UL (ref 1.7–7.7)
NEUTROPHILS RELATIVE PERCENT: 43.3 %
NITRITE, URINE: NEGATIVE
PDW BLD-RTO: 13.3 % (ref 12.4–15.4)
PH UA: 6 (ref 5–8)
PLATELET # BLD: 301 K/UL (ref 135–450)
PMV BLD AUTO: 8.7 FL (ref 5–10.5)
POTASSIUM SERPL-SCNC: 3.6 MMOL/L (ref 3.5–5.1)
PROTEIN UA: ABNORMAL MG/DL
RBC # BLD: 4.17 M/UL (ref 4–5.2)
RBC UA: ABNORMAL /HPF (ref 0–4)
SODIUM BLD-SCNC: 141 MMOL/L (ref 136–145)
SPECIFIC GRAVITY UA: >=1.03 (ref 1–1.03)
TOTAL PROTEIN: 6.8 G/DL (ref 6.4–8.2)
URINE REFLEX TO CULTURE: ABNORMAL
URINE TYPE: ABNORMAL
UROBILINOGEN, URINE: 0.2 E.U./DL
WBC # BLD: 10.6 K/UL (ref 4–11)
WBC UA: ABNORMAL /HPF (ref 0–5)

## 2023-02-22 PROCEDURE — 96374 THER/PROPH/DIAG INJ IV PUSH: CPT

## 2023-02-22 PROCEDURE — 6370000000 HC RX 637 (ALT 250 FOR IP): Performed by: EMERGENCY MEDICINE

## 2023-02-22 PROCEDURE — 96375 TX/PRO/DX INJ NEW DRUG ADDON: CPT

## 2023-02-22 PROCEDURE — 74176 CT ABD & PELVIS W/O CONTRAST: CPT

## 2023-02-22 PROCEDURE — 36415 COLL VENOUS BLD VENIPUNCTURE: CPT

## 2023-02-22 PROCEDURE — 2580000003 HC RX 258: Performed by: EMERGENCY MEDICINE

## 2023-02-22 PROCEDURE — 85025 COMPLETE CBC W/AUTO DIFF WBC: CPT

## 2023-02-22 PROCEDURE — 84703 CHORIONIC GONADOTROPIN ASSAY: CPT

## 2023-02-22 PROCEDURE — 81001 URINALYSIS AUTO W/SCOPE: CPT

## 2023-02-22 PROCEDURE — 99284 EMERGENCY DEPT VISIT MOD MDM: CPT

## 2023-02-22 PROCEDURE — 6360000002 HC RX W HCPCS: Performed by: EMERGENCY MEDICINE

## 2023-02-22 PROCEDURE — 80053 COMPREHEN METABOLIC PANEL: CPT

## 2023-02-22 RX ORDER — KETOROLAC TROMETHAMINE 30 MG/ML
30 INJECTION, SOLUTION INTRAMUSCULAR; INTRAVENOUS ONCE
Status: COMPLETED | OUTPATIENT
Start: 2023-02-22 | End: 2023-02-22

## 2023-02-22 RX ORDER — ONDANSETRON 2 MG/ML
4 INJECTION INTRAMUSCULAR; INTRAVENOUS ONCE
Status: COMPLETED | OUTPATIENT
Start: 2023-02-22 | End: 2023-02-22

## 2023-02-22 RX ORDER — HYDROCODONE BITARTRATE AND ACETAMINOPHEN 5; 325 MG/1; MG/1
1 TABLET ORAL EVERY 6 HOURS PRN
Qty: 12 TABLET | Refills: 0 | Status: SHIPPED | OUTPATIENT
Start: 2023-02-22 | End: 2023-02-25

## 2023-02-22 RX ORDER — ONDANSETRON 4 MG/1
4 TABLET, ORALLY DISINTEGRATING ORAL 3 TIMES DAILY PRN
Qty: 15 TABLET | Refills: 0 | Status: SHIPPED | OUTPATIENT
Start: 2023-02-22

## 2023-02-22 RX ORDER — TAMSULOSIN HYDROCHLORIDE 0.4 MG/1
0.4 CAPSULE ORAL DAILY
Qty: 10 CAPSULE | Refills: 0 | Status: SHIPPED | OUTPATIENT
Start: 2023-02-22

## 2023-02-22 RX ORDER — KETOROLAC TROMETHAMINE 10 MG/1
10 TABLET, FILM COATED ORAL EVERY 6 HOURS PRN
Qty: 20 TABLET | Refills: 0 | Status: SHIPPED | OUTPATIENT
Start: 2023-02-22

## 2023-02-22 RX ORDER — 0.9 % SODIUM CHLORIDE 0.9 %
1000 INTRAVENOUS SOLUTION INTRAVENOUS ONCE
Status: COMPLETED | OUTPATIENT
Start: 2023-02-22 | End: 2023-02-22

## 2023-02-22 RX ORDER — HYDROCODONE BITARTRATE AND ACETAMINOPHEN 5; 325 MG/1; MG/1
1 TABLET ORAL ONCE
Status: COMPLETED | OUTPATIENT
Start: 2023-02-22 | End: 2023-02-22

## 2023-02-22 RX ADMIN — KETOROLAC TROMETHAMINE 30 MG: 30 INJECTION, SOLUTION INTRAMUSCULAR; INTRAVENOUS at 04:46

## 2023-02-22 RX ADMIN — HYDROCODONE BITARTRATE AND ACETAMINOPHEN 1 TABLET: 5; 325 TABLET ORAL at 06:49

## 2023-02-22 RX ADMIN — SODIUM CHLORIDE 1000 ML: 9 INJECTION, SOLUTION INTRAVENOUS at 04:46

## 2023-02-22 RX ADMIN — ONDANSETRON 4 MG: 2 INJECTION INTRAMUSCULAR; INTRAVENOUS at 04:46

## 2023-02-22 ASSESSMENT — PAIN SCALES - GENERAL
PAINLEVEL_OUTOF10: 7
PAINLEVEL_OUTOF10: 10
PAINLEVEL_OUTOF10: 4

## 2023-02-22 ASSESSMENT — PAIN DESCRIPTION - DESCRIPTORS
DESCRIPTORS: SHARP
DESCRIPTORS: SHARP
DESCRIPTORS: DISCOMFORT

## 2023-02-22 ASSESSMENT — PAIN - FUNCTIONAL ASSESSMENT
PAIN_FUNCTIONAL_ASSESSMENT: ACTIVITIES ARE NOT PREVENTED

## 2023-02-22 ASSESSMENT — PAIN DESCRIPTION - FREQUENCY
FREQUENCY: CONTINUOUS

## 2023-02-22 ASSESSMENT — PAIN DESCRIPTION - PAIN TYPE
TYPE: ACUTE PAIN

## 2023-02-22 ASSESSMENT — PAIN DESCRIPTION - ORIENTATION
ORIENTATION: RIGHT

## 2023-02-22 ASSESSMENT — PAIN DESCRIPTION - ONSET
ONSET: SUDDEN

## 2023-02-22 ASSESSMENT — PAIN DESCRIPTION - LOCATION
LOCATION: FLANK

## 2023-02-22 ASSESSMENT — LIFESTYLE VARIABLES
HOW MANY STANDARD DRINKS CONTAINING ALCOHOL DO YOU HAVE ON A TYPICAL DAY: 3 OR 4
HOW OFTEN DO YOU HAVE A DRINK CONTAINING ALCOHOL: 2-4 TIMES A MONTH

## 2023-02-22 NOTE — DISCHARGE INSTRUCTIONS
Return for fever, intolerable pain, persistent vomiting, inability urinate or other worsening symptoms. Drink plenty of fluids. Strain your urine.

## 2023-02-22 NOTE — ED PROVIDER NOTES
[facility]  EMERGENCY DEPT VISIT      Patient Identification  Juancho Baker is a 23 y.o. female. Chief Complaint   Flank Pain (Right/)      History of Present Illness:    History was obtained from patient. This is a  23 y.o. female who presents ambulatory  to the ED with complaints of 2-hour history of right flank pain which radiates around to the right lower quadrant and suprapubic region. Patient states that she always has some mild discomfort in the right flank area but it is typically very tolerable. Tonight it woke her up from sleep with severe pain was accompanied by nausea and vomiting. Patient reports urinary frequency and urgency but no dysuria. No fever. No diarrhea. She has a history of kidney stones and kidney infections in the past.  She has taken no medication for the pain. She rates the pain 9 out of 10. Pravin Serrano Past Medical History:   Diagnosis Date    ESBL (extended spectrum beta-lactamase) producing bacteria infection 01/05/2020    urine    Kidney stone        History reviewed. No pertinent surgical history. Current Facility-Administered Medications:     HYDROcodone-acetaminophen (NORCO) 5-325 MG per tablet 1 tablet, 1 tablet, Oral, Once, Portillo Mora MD    Current Outpatient Medications:     ketorolac (TORADOL) 10 MG tablet, Take 1 tablet by mouth every 6 hours as needed for Pain, Disp: 20 tablet, Rfl: 0    ondansetron (ZOFRAN-ODT) 4 MG disintegrating tablet, Take 1 tablet by mouth 3 times daily as needed for Nausea or Vomiting, Disp: 15 tablet, Rfl: 0    tamsulosin (FLOMAX) 0.4 MG capsule, Take 1 capsule by mouth daily, Disp: 10 capsule, Rfl: 0    HYDROcodone-acetaminophen (NORCO) 5-325 MG per tablet, Take 1 tablet by mouth every 6 hours as needed for Pain for up to 3 days. Intended supply: 3 days.  Take lowest dose possible to manage pain Max Daily Amount: 4 tablets, Disp: 12 tablet, Rfl: 0    Allergies   Allergen Reactions    Ritalin [Methylphenidate] Shortness Of Breath Oxycodone      Difficulty breathing and itching  Difficulty breathing and itching         Social History     Socioeconomic History    Marital status: Single     Spouse name: Not on file    Number of children: Not on file    Years of education: Not on file    Highest education level: Not on file   Occupational History    Not on file   Tobacco Use    Smoking status: Never    Smokeless tobacco: Never   Vaping Use    Vaping Use: Never used   Substance and Sexual Activity    Alcohol use: Yes     Comment: socially    Drug use: Not Currently    Sexual activity: Not on file   Other Topics Concern    Not on file   Social History Narrative    Not on file     Social Determinants of Health     Financial Resource Strain: Not on file   Food Insecurity: Not on file   Transportation Needs: Not on file   Physical Activity: Not on file   Stress: Not on file   Social Connections: Not on file   Intimate Partner Violence: Not on file   Housing Stability: Not on file       Nursing Notes Reviewed        PHYSICAL EXAM:  GENERAL APPEARANCE: Romulo Yin is in no acute respiratory distress. Awake and alert. VITAL SIGNS:   ED Triage Vitals [02/22/23 1546]   Enc Vitals Group      BP (!) 126/98      Heart Rate 70      Resp 18      Temp 98.4 °F (36.9 °C)      Temp Source Oral      SpO2 100 %      Weight 164 lb 0.4 oz (74.4 kg)      Height 5' 3\" (1.6 m)      Head Circumference       Peak Flow       Pain Score       Pain Loc       Pain Edu? Excl. in [address]? HEAD: Normocephalic, atraumatic. EYES:  Extraocular muscles are intact. Pupils equal round and reactive to light. Conjunctivas are pink. Negative scleral icterus. ENT:  Mucous membranes are moist.  Pharynx without erythema or exudates. NECK: Nontender and supple. No cervical adenopathy. CHEST:  Clear to auscultation bilaterally. No rales, rhonchi, or wheezing. HEART:  Regular rate and regular rhythm. No murmurs. Strong and equal pulses in the upper and lower extremities. ABDOMEN: Soft,  nondistended, positive bowel sounds. abdomen is tender in right suprapubic region. No rebound. no guarding. Right flank tenderness  MUSCULOSKELETAL: The calves are nontender to palpation. Active range of motion of the upper and lower extremities. No edema. NEUROLOGICAL: Awake, alert and oriented x 3. Power intact in the upper and lower extremities. DERMATOLOGIC: No petechiae, rashes, or ecchymoses. No erythema. PSYCH: normal mood and affect. Normal thought content. ED COURSE AND MEDICAL DECISION MAKING:    Record Review:  I have reviewed [de-identified] Geraldo's old records which reveal the following pertinent information:   none    Radiology:  Films have been read by radiologist as noted in chart unless otherwise stated. Other radiologic studies (i.e. CT, MRI, ultrasounds, etc ) have been interpreted by radiologist.       CT ABDOMEN PELVIS WO CONTRAST Additional Contrast? None   Final Result   1. Mildly obstructing 2-3 mm stone in the right UVJ. 2.  Bilateral punctate nephrolithiasis. CT ABDOMEN PELVIS WO CONTRAST Additional Contrast? None    Result Date: 2/22/2023  EXAMINATION: CT OF THE ABDOMEN AND PELVIS WITHOUT CONTRAST 2/22/2023 4:57 am TECHNIQUE: CT of the abdomen and pelvis was performed without the administration of intravenous contrast. Multiplanar reformatted images are provided for review. Automated exposure control, iterative reconstruction, and/or weight based adjustment of the mA/kV was utilized to reduce the radiation dose to as low as reasonably achievable.  COMPARISON: 05/04/2021 HISTORY: ORDERING SYSTEM PROVIDED HISTORY: right flank pain TECHNOLOGIST PROVIDED HISTORY: Reason for exam:->right flank pain Additional Contrast?->None Decision Support Exception - unselect if not a suspected or confirmed emergency medical condition->Emergency Medical Condition (MA) Is the patient pregnant?->No Reason for Exam: Rt flank pain Additional signs and symptoms: Flank pain - Patient to the ER with complaints of right sided flank pain that she normally has at baseline d/t known kidney stones, but it got severe this morning and has been getting progressively worse throughout the day. Patient is unable to sit still and writhing in pain at time of triage. Relevant Medical/Surgical History: Hx of kidney stones & obesity. No hx of any relevant surgeries or of any cancer. FINDINGS: LOWER CHEST: No focal abnormality. KIDNEYS AND URINARY TRACT: Bilateral punctate nephrolithiasis. Mild right hydronephrosis is noted to the level of a 2-3 mm stone in the right UVJ. No significant perinephric stranding. ORGANS: Lack of intravenous contrast limits evaluation of the solid organs and bowel. The liver, gallbladder, pancreas, spleen, and adrenals reveal no acute abnormality. GI/BOWEL: No bowel dilatation or wall thickening. Normal appendix. PELVIS: No acute findings. PERITONEUM/RETROPERITONEUM: No lymphadenopathy is noted. No free air or free fluid. The aorta is normal in caliber. BONES/SOFT TISSUES: No acute osseous abnormality is identified. *Unless otherwise specified, incidental findings do not require dedicated imaging follow-up. 1.  Mildly obstructing 2-3 mm stone in the right UVJ. 2.  Bilateral punctate nephrolithiasis.         Labs:  Results for orders placed or performed during the hospital encounter of 02/22/23   CBC with Auto Differential   Result Value Ref Range    WBC 10.6 4.0 - 11.0 K/uL    RBC 4.17 4.00 - 5.20 M/uL    Hemoglobin 12.8 12.0 - 16.0 g/dL    Hematocrit 38.4 36.0 - 48.0 %    MCV 92.0 80.0 - 100.0 fL    MCH 30.7 26.0 - 34.0 pg    MCHC 33.3 31.0 - 36.0 g/dL    RDW 13.3 12.4 - 15.4 %    Platelets 420 877 - 732 K/uL    MPV 8.7 5.0 - 10.5 fL    Neutrophils % 43.3 %    Lymphocytes % 45.8 %    Monocytes % 7.8 %    Eosinophils % 2.3 %    Basophils % 0.8 %    Neutrophils Absolute 4.6 1.7 - 7.7 K/uL    Lymphocytes Absolute 4.8 1.0 - 5.1 K/uL    Monocytes Absolute 0.8 0.0 - 1.3 K/uL    Eosinophils Absolute 0.2 0.0 - 0.6 K/uL    Basophils Absolute 0.1 0.0 - 0.2 K/uL   Comprehensive Metabolic Panel   Result Value Ref Range    Sodium 141 136 - 145 mmol/L    Potassium 3.6 3.5 - 5.1 mmol/L    Chloride 108 99 - 110 mmol/L    CO2 22 21 - 32 mmol/L    Anion Gap 11 3 - 16    Glucose 112 (H) 70 - 99 mg/dL    BUN 10 7 - 20 mg/dL    Creatinine 0.8 0.6 - 1.1 mg/dL    Est, Glom Filt Rate >60 >60    Calcium 9.8 8.3 - 10.6 mg/dL    Total Protein 6.8 6.4 - 8.2 g/dL    Albumin 4.1 3.4 - 5.0 g/dL    Albumin/Globulin Ratio 1.5 1.1 - 2.2    Total Bilirubin <0.2 0.0 - 1.0 mg/dL    Alkaline Phosphatase 85 40 - 129 U/L    ALT 14 10 - 40 U/L    AST 13 (L) 15 - 37 U/L   HCG Qualitative, Serum   Result Value Ref Range    hCG Qual Negative Detects HCG level >10 MIU/mL   Urinalysis with Reflex to Culture    Specimen: Urine   Result Value Ref Range    Color, UA Yellow Straw/Yellow    Clarity, UA Clear Clear    Glucose, Ur Negative Negative mg/dL    Bilirubin Urine Negative Negative    Ketones, Urine Negative Negative mg/dL    Specific Gravity, UA >=1.030 1.005 - 1.030    Blood, Urine LARGE (A) Negative    pH, UA 6.0 5.0 - 8.0    Protein, UA TRACE (A) Negative mg/dL    Urobilinogen, Urine 0.2 <2.0 E.U./dL    Nitrite, Urine Negative Negative    Leukocyte Esterase, Urine Negative Negative    Microscopic Examination YES     Urine Type Other     Urine Reflex to Culture Not Indicated    Microscopic Urinalysis   Result Value Ref Range    WBC, UA 3-5 0 - 5 /HPF    RBC, UA  (A) 0 - 4 /HPF    Epithelial Cells, UA 6-10 (A) 0 - 5 /HPF    Bacteria, UA 2+ (A) None Seen /HPF       Treatment in the department:  Patient received the following while in the ED.     Medications   HYDROcodone-acetaminophen (NORCO) 5-325 MG per tablet 1 tablet (has no administration in time range)   ketorolac (TORADOL) injection 30 mg (30 mg IntraVENous Given 2/22/23 0446)   ondansetron (ZOFRAN) injection 4 mg (4 mg IntraVENous Given 2/22/23 0446) 0.9 % sodium chloride bolus (0 mLs IntraVENous Stopped 2/22/23 0600)       Repeat exam at 0645 shows pain improved but still having discomfort in perineum. Medical decision making with differential diagnosis:  Social determinants affecting care: none  Chronic conditions affecting care: none    Patient presents with right flank and abdominal pain starting abruptly a few hours ago. She has a known history of kidney stones in the past.  Patient did not have any preceding cystitis symptoms. He arrives afebrile with a benign abdominal exam.  Patient is not pregnant. Urinalysis shows hematuria but no significant pyuria and negative nitrite and leukocyte esterase. No signs of infection. She had no significant leukocytosis on CBC. No NADIYA. Noncontrast CT imaging of the abdomen pelvis showed a 2 to 3 mm stone at the right UVJ with mild hydronephrosis. No signs of appendicitis. No large ovarian cyst to suggest torsion. Patient had no further vomiting while in the emergency department. Her pain was adequately controlled and she appears stable for discharge to home. I do not feel that she requires admission at this time as the stone should pass without intervention. I estimate there is LOW risk for ACUTE APPENDICITIS, BOWEL OBSTRUCTION, CHOLECYSTITIS, COMPLICATED DIVERTICULITIS, INCARCERATED HERNIA, PANCREATITIS, PELVIC INFLAMMATORY DISEASE, PERFORATED BOWEL or ULCER, ECTOPIC PREGNANCY, or TUBO-OVARIAN ABSCESS, OVARIAN TORSION,  thus I consider the discharge disposition reasonable. Also, there is no evidence or peritonitis, sepsis, or toxicity. Ana Barraza and I have discussed the diagnosis and risks, and we agree with discharging home to follow-up with their primary doctor. We also discussed returning to the Emergency Department immediately if new or worsening symptoms occur. Clinical Impression:  1. Right ureteral stone        Dispo:  Patient will be discharged at this time.  Patient was informed of this decision and agrees with plan. I have discussed lab and xray findings with patient and they understand. Questions were answered to the best of my ability. Followup plan:  Bibiana Mccullough MD  [address] [address]  [phone]    Schedule an appointment as soon as possible for a visit       Discharge vitals:  Blood pressure (!) 126/98, pulse 70, temperature 98.4 °F (36.9 °C), temperature source Oral, resp. rate 18, height 5' 3\" (1.6 m), weight 164 lb 0.4 oz (74.4 kg), last menstrual period 02/08/2023, SpO2 100 %. Prescriptions given:   New Prescriptions    HYDROCODONE-ACETAMINOPHEN (NORCO) 5-325 MG PER TABLET    Take 1 tablet by mouth every 6 hours as needed for Pain for up to 3 days. Intended supply: 3 days. Take lowest dose possible to manage pain Max Daily Amount: 4 tablets    KETOROLAC (TORADOL) 10 MG TABLET    Take 1 tablet by mouth every 6 hours as needed for Pain    ONDANSETRON (ZOFRAN-ODT) 4 MG DISINTEGRATING TABLET    Take 1 tablet by mouth 3 times daily as needed for Nausea or Vomiting    TAMSULOSIN (FLOMAX) 0.4 MG CAPSULE    Take 1 capsule by mouth daily       I personally saw the patient and independently provided 0 minutes of non-concurrent critical care out of the total shared critical care time provided. This chart was created using Dragon voice recognition software.         Laya Escamilla MD  02/22/23 1481

## 2023-02-22 NOTE — ED TRIAGE NOTES
Patient to the ER with complaints of right sided flank pain that she normally has at baseline d/t known kidney stones, but it got severe this morning and has been getting progressively worse throughout the day. Patient is unable to sit still and writhing in pain at time of triage.

## 2023-07-18 ENCOUNTER — HOSPITAL ENCOUNTER (EMERGENCY)
Age: 23
Discharge: HOME | End: 2023-07-18
Attending: EMERGENCY MEDICINE

## 2023-07-18 VITALS
HEART RATE: 73 BPM | OXYGEN SATURATION: 100 % | BODY MASS INDEX: 29.61 KG/M2 | WEIGHT: 167.11 LBS | RESPIRATION RATE: 20 BRPM | HEIGHT: 63 IN | DIASTOLIC BLOOD PRESSURE: 128 MMHG | SYSTOLIC BLOOD PRESSURE: 141 MMHG | TEMPERATURE: 98 F

## 2023-07-18 DIAGNOSIS — R10.9 FLANK PAIN: Primary | ICD-10-CM

## 2023-07-18 LAB
ALBUMIN SERPL-MCNC: 4.3 G/DL (ref 3.4–5)
ALBUMIN/GLOB SERPL: 1.5 {RATIO} (ref 1.1–2.2)
ALP SERPL-CCNC: 96 U/L (ref 40–129)
ALT SERPL-CCNC: 33 U/L (ref 10–40)
ANION GAP SERPL CALCULATED.3IONS-SCNC: 9 MMOL/L (ref 3–16)
AST SERPL-CCNC: 22 U/L (ref 15–37)
BACTERIA URNS QL MICRO: ABNORMAL /HPF
BASOPHILS # BLD: 0.1 K/UL (ref 0–0.2)
BASOPHILS NFR BLD: 1.2 %
BILIRUB SERPL-MCNC: <0.2 MG/DL (ref 0–1)
BILIRUB UR QL STRIP.AUTO: NEGATIVE
BUN SERPL-MCNC: 12 MG/DL (ref 7–20)
CALCIUM SERPL-MCNC: 10.8 MG/DL (ref 8.3–10.6)
CHLORIDE SERPL-SCNC: 106 MMOL/L (ref 99–110)
CLARITY UR: CLEAR
CO2 SERPL-SCNC: 26 MMOL/L (ref 21–32)
COLOR UR: YELLOW
CREAT SERPL-MCNC: 0.8 MG/DL (ref 0.6–1.1)
DEPRECATED RDW RBC AUTO: 13.2 % (ref 12.4–15.4)
EOSINOPHIL # BLD: 0.3 K/UL (ref 0–0.6)
EOSINOPHIL NFR BLD: 3.2 %
EPI CELLS #/AREA URNS HPF: ABNORMAL /HPF (ref 0–5)
GFR SERPLBLD CREATININE-BSD FMLA CKD-EPI: >60 ML/MIN/{1.73_M2}
GLUCOSE SERPL-MCNC: 103 MG/DL (ref 70–99)
GLUCOSE UR STRIP.AUTO-MCNC: NEGATIVE MG/DL
HCG UR QL: NEGATIVE
HCT VFR BLD AUTO: 39.1 % (ref 36–48)
HGB BLD-MCNC: 13.3 G/DL (ref 12–16)
HGB UR QL STRIP.AUTO: ABNORMAL
KETONES UR STRIP.AUTO-MCNC: NEGATIVE MG/DL
LEUKOCYTE ESTERASE UR QL STRIP.AUTO: ABNORMAL
LIPASE SERPL-CCNC: 33 U/L (ref 13–60)
LYMPHOCYTES # BLD: 4.1 K/UL (ref 1–5.1)
LYMPHOCYTES NFR BLD: 41.9 %
MCH RBC QN AUTO: 31.8 PG (ref 26–34)
MCHC RBC AUTO-ENTMCNC: 34.1 G/DL (ref 31–36)
MCV RBC AUTO: 93.4 FL (ref 80–100)
MONOCYTES # BLD: 0.8 K/UL (ref 0–1.3)
MONOCYTES NFR BLD: 8.3 %
MUCOUS THREADS #/AREA URNS LPF: ABNORMAL /LPF
NEUTROPHILS # BLD: 4.4 K/UL (ref 1.7–7.7)
NEUTROPHILS NFR BLD: 45.4 %
NITRITE UR QL STRIP.AUTO: NEGATIVE
PH UR STRIP.AUTO: 6 [PH] (ref 5–8)
PLATELET # BLD AUTO: 292 K/UL (ref 135–450)
PMV BLD AUTO: 9.1 FL (ref 5–10.5)
POTASSIUM SERPL-SCNC: 3.7 MMOL/L (ref 3.5–5.1)
PROT SERPL-MCNC: 7.1 G/DL (ref 6.4–8.2)
PROT UR STRIP.AUTO-MCNC: NEGATIVE MG/DL
RBC # BLD AUTO: 4.19 M/UL (ref 4–5.2)
RBC #/AREA URNS HPF: ABNORMAL /HPF (ref 0–4)
SODIUM SERPL-SCNC: 141 MMOL/L (ref 136–145)
SP GR UR STRIP.AUTO: 1.02 (ref 1–1.03)
UA COMPLETE W REFLEX CULTURE PNL UR: ABNORMAL
UA DIPSTICK W REFLEX MICRO PNL UR: YES
URN SPEC COLLECT METH UR: ABNORMAL
UROBILINOGEN UR STRIP-ACNC: 0.2 E.U./DL
WBC # BLD AUTO: 9.7 K/UL (ref 4–11)
WBC #/AREA URNS HPF: ABNORMAL /HPF (ref 0–5)

## 2023-07-18 PROCEDURE — 85025 COMPLETE CBC W/AUTO DIFF WBC: CPT

## 2023-07-18 PROCEDURE — 99284 EMERGENCY DEPT VISIT MOD MDM: CPT

## 2023-07-18 PROCEDURE — 96374 THER/PROPH/DIAG INJ IV PUSH: CPT

## 2023-07-18 PROCEDURE — 83690 ASSAY OF LIPASE: CPT

## 2023-07-18 PROCEDURE — 81001 URINALYSIS AUTO W/SCOPE: CPT

## 2023-07-18 PROCEDURE — 96372 THER/PROPH/DIAG INJ SC/IM: CPT

## 2023-07-18 PROCEDURE — 84703 CHORIONIC GONADOTROPIN ASSAY: CPT

## 2023-07-18 PROCEDURE — 6360000002 HC RX W HCPCS: Performed by: EMERGENCY MEDICINE

## 2023-07-18 PROCEDURE — 36415 COLL VENOUS BLD VENIPUNCTURE: CPT

## 2023-07-18 PROCEDURE — 96375 TX/PRO/DX INJ NEW DRUG ADDON: CPT

## 2023-07-18 PROCEDURE — 80053 COMPREHEN METABOLIC PANEL: CPT

## 2023-07-18 RX ORDER — CEFUROXIME AXETIL 250 MG/1
250 TABLET ORAL 2 TIMES DAILY
Qty: 14 TABLET | Refills: 0 | Status: SHIPPED | OUTPATIENT
Start: 2023-07-18 | End: 2023-07-25

## 2023-07-18 RX ORDER — CEFTRIAXONE 1 G/1
1000 INJECTION, POWDER, FOR SOLUTION INTRAMUSCULAR; INTRAVENOUS ONCE
Status: COMPLETED | OUTPATIENT
Start: 2023-07-18 | End: 2023-07-18

## 2023-07-18 RX ORDER — NAPROXEN 500 MG/1
500 TABLET ORAL 2 TIMES DAILY WITH MEALS
Qty: 60 TABLET | Refills: 5 | Status: SHIPPED | OUTPATIENT
Start: 2023-07-18

## 2023-07-18 RX ORDER — FENTANYL CITRATE 50 UG/ML
25 INJECTION, SOLUTION INTRAMUSCULAR; INTRAVENOUS ONCE
Status: COMPLETED | OUTPATIENT
Start: 2023-07-18 | End: 2023-07-18

## 2023-07-18 RX ORDER — ONDANSETRON 2 MG/ML
4 INJECTION INTRAMUSCULAR; INTRAVENOUS ONCE
Status: COMPLETED | OUTPATIENT
Start: 2023-07-18 | End: 2023-07-18

## 2023-07-18 RX ORDER — HYDROCODONE BITARTRATE AND ACETAMINOPHEN 5; 325 MG/1; MG/1
1 TABLET ORAL EVERY 4 HOURS PRN
Qty: 8 TABLET | Refills: 0 | Status: SHIPPED | OUTPATIENT
Start: 2023-07-18 | End: 2023-07-21

## 2023-07-18 RX ORDER — ONDANSETRON 4 MG/1
4 TABLET, FILM COATED ORAL EVERY 8 HOURS PRN
Qty: 20 TABLET | Refills: 0 | Status: SHIPPED | OUTPATIENT
Start: 2023-07-18

## 2023-07-18 RX ORDER — KETOROLAC TROMETHAMINE 15 MG/ML
15 INJECTION, SOLUTION INTRAMUSCULAR; INTRAVENOUS ONCE
Status: COMPLETED | OUTPATIENT
Start: 2023-07-18 | End: 2023-07-18

## 2023-07-18 RX ADMIN — FENTANYL CITRATE 25 MCG: 50 INJECTION, SOLUTION INTRAMUSCULAR; INTRAVENOUS at 04:18

## 2023-07-18 RX ADMIN — ONDANSETRON 4 MG: 2 INJECTION INTRAMUSCULAR; INTRAVENOUS at 04:17

## 2023-07-18 RX ADMIN — CEFTRIAXONE 1000 MG: 1 INJECTION, POWDER, FOR SOLUTION INTRAMUSCULAR; INTRAVENOUS at 05:36

## 2023-07-18 RX ADMIN — KETOROLAC TROMETHAMINE 15 MG: 15 INJECTION, SOLUTION INTRAMUSCULAR; INTRAVENOUS at 05:36

## 2023-07-18 ASSESSMENT — PAIN SCALES - GENERAL
PAINLEVEL_OUTOF10: 10
PAINLEVEL_OUTOF10: 4

## 2023-07-18 ASSESSMENT — PAIN DESCRIPTION - ORIENTATION
ORIENTATION: LEFT
ORIENTATION: LEFT

## 2023-07-18 ASSESSMENT — PAIN DESCRIPTION - LOCATION
LOCATION: FLANK
LOCATION: FLANK

## 2023-07-18 NOTE — PROGRESS NOTES
PIV per RN w aseptic tech. Labs sent  Urine sent. Meds given per MD order. Pt tolerated well.      Monitor in place

## 2023-07-18 NOTE — PROGRESS NOTES
Meds given per MD order. DC and rx info given. Pt verbalized understanding.  [address] in stable condition

## 2023-07-19 NOTE — ED PROVIDER NOTES
[address] [facility],[address] ENCOUNTER        Pt Name: Emeterio Stokes  MRN: [id number]  [address] [facility] 2000  Date of evaluation: 7/18/2023  Provider: Abdirashid Viveros MD  PCP: MACO Esposito CNP  Note Started: 4:18 AM EDT 7/19/23    CHIEF COMPLAINT       Chief Complaint   Patient presents with    Flank Pain       HISTORY OF PRESENT ILLNESS: 1 or more Elements   History From: Patient        Emeterio Stokes is a 23 y.o. female who presents for evaluation of left-sided flank pain. Patient reports an acute onset of left-sided flank pain that is persistent but intermittently waxing and waning in nature. Remitting or exacerbating factors. Patient reports a history of previous kidney stone and states that symptoms feel similar. She denies changes in bowel or urine function fevers or vomiting. She report associated nausea. Denies abnormal vaginal bleeding or discharge. Reviewing patient's medical record and previous CT scan that showed punctuate bilateral nephrolithiasis in addition to previous noted urolithiasis. Nursing Notes were all reviewed and agreed with or any disagreements were addressed in the HPI. REVIEW OF SYSTEMS :      Review of Systems    Positives and Pertinent negatives as per HPI. SURGICAL HISTORY   History reviewed. No pertinent surgical history.      [facility]       Discharge Medication List as of 7/18/2023  5:41 AM        CONTINUE these medications which have NOT CHANGED    Details   ketorolac (TORADOL) 10 MG tablet Take 1 tablet by mouth every 6 hours as needed for Pain, Disp-20 tablet, R-0Normal      ondansetron (ZOFRAN-ODT) 4 MG disintegrating tablet Take 1 tablet by mouth 3 times daily as needed for Nausea or Vomiting, Disp-15 tablet, R-0Normal      tamsulosin (FLOMAX) 0.4 MG capsule Take 1 capsule by mouth daily, Disp-10 capsule, R-0Normal             ALLERGIES     Ritalin [methylphenidate] and Oxycodone    FAMILYHISTORY

## 2023-09-21 ENCOUNTER — APPOINTMENT (OUTPATIENT)
Dept: GENERAL RADIOLOGY | Age: 23
DRG: 854 | End: 2023-09-21
Attending: EMERGENCY MEDICINE

## 2023-09-21 ENCOUNTER — ANESTHESIA EVENT (OUTPATIENT)
Dept: OPERATING ROOM | Age: 23
End: 2023-09-21

## 2023-09-21 ENCOUNTER — HOSPITAL ENCOUNTER (INPATIENT)
Age: 23
LOS: 5 days | Discharge: HOME | DRG: 854 | End: 2023-09-26
Attending: FAMILY MEDICINE | Admitting: STUDENT IN AN ORGANIZED HEALTH CARE EDUCATION/TRAINING PROGRAM

## 2023-09-21 ENCOUNTER — APPOINTMENT (OUTPATIENT)
Dept: CT IMAGING | Age: 23
DRG: 854 | End: 2023-09-21

## 2023-09-21 DIAGNOSIS — N10 ACUTE PYELONEPHRITIS: ICD-10-CM

## 2023-09-21 DIAGNOSIS — N20.1 URETEROLITHIASIS: Primary | ICD-10-CM

## 2023-09-21 PROBLEM — N12 PYELONEPHRITIS: Status: ACTIVE | Noted: 2023-09-21

## 2023-09-21 LAB
ANION GAP SERPL CALCULATED.3IONS-SCNC: 10 MMOL/L (ref 3–16)
BACTERIA URNS QL MICRO: ABNORMAL /HPF
BASOPHILS # BLD: 0.1 K/UL (ref 0–0.2)
BASOPHILS NFR BLD: 0.6 %
BILIRUB UR QL STRIP.AUTO: NEGATIVE
BUN SERPL-MCNC: 11 MG/DL (ref 7–20)
CALCIUM SERPL-MCNC: 10.6 MG/DL (ref 8.3–10.6)
CHLORIDE SERPL-SCNC: 106 MMOL/L (ref 99–110)
CLARITY UR: ABNORMAL
CO2 SERPL-SCNC: 22 MMOL/L (ref 21–32)
COLOR UR: YELLOW
CREAT SERPL-MCNC: 0.8 MG/DL (ref 0.6–1.1)
DEPRECATED RDW RBC AUTO: 13.2 % (ref 12.4–15.4)
EOSINOPHIL # BLD: 0 K/UL (ref 0–0.6)
EOSINOPHIL NFR BLD: 0.1 %
EPI CELLS #/AREA URNS HPF: ABNORMAL /HPF (ref 0–5)
GFR SERPLBLD CREATININE-BSD FMLA CKD-EPI: >60 ML/MIN/{1.73_M2}
GLUCOSE SERPL-MCNC: 118 MG/DL (ref 70–99)
GLUCOSE UR STRIP.AUTO-MCNC: NEGATIVE MG/DL
HCG UR QL: NEGATIVE
HCT VFR BLD AUTO: 38.2 % (ref 36–48)
HGB BLD-MCNC: 13.1 G/DL (ref 12–16)
HGB UR QL STRIP.AUTO: ABNORMAL
KETONES UR STRIP.AUTO-MCNC: NEGATIVE MG/DL
LEUKOCYTE ESTERASE UR QL STRIP.AUTO: ABNORMAL
LYMPHOCYTES # BLD: 1.5 K/UL (ref 1–5.1)
LYMPHOCYTES NFR BLD: 10 %
MCH RBC QN AUTO: 31.1 PG (ref 26–34)
MCHC RBC AUTO-ENTMCNC: 34.2 G/DL (ref 31–36)
MCV RBC AUTO: 90.9 FL (ref 80–100)
MONOCYTES # BLD: 1.2 K/UL (ref 0–1.3)
MONOCYTES NFR BLD: 8.4 %
NEUTROPHILS # BLD: 12.1 K/UL (ref 1.7–7.7)
NEUTROPHILS NFR BLD: 80.9 %
NITRITE UR QL STRIP.AUTO: POSITIVE
PH UR STRIP.AUTO: 6 [PH] (ref 5–8)
PLATELET # BLD AUTO: 246 K/UL (ref 135–450)
PMV BLD AUTO: 8.7 FL (ref 5–10.5)
POTASSIUM SERPL-SCNC: 4 MMOL/L (ref 3.5–5.1)
PROT UR STRIP.AUTO-MCNC: ABNORMAL MG/DL
RBC # BLD AUTO: 4.2 M/UL (ref 4–5.2)
RBC #/AREA URNS HPF: ABNORMAL /HPF (ref 0–4)
SODIUM SERPL-SCNC: 138 MMOL/L (ref 136–145)
SP GR UR STRIP.AUTO: 1.02 (ref 1–1.03)
UA COMPLETE W REFLEX CULTURE PNL UR: YES
UA DIPSTICK W REFLEX MICRO PNL UR: YES
URN SPEC COLLECT METH UR: ABNORMAL
UROBILINOGEN UR STRIP-ACNC: 0.2 E.U./DL
WBC # BLD AUTO: 14.9 K/UL (ref 4–11)
WBC #/AREA URNS HPF: >100 /HPF (ref 0–5)

## 2023-09-21 PROCEDURE — 87186 SC STD MICRODIL/AGAR DIL: CPT

## 2023-09-21 PROCEDURE — 80048 BASIC METABOLIC PNL TOTAL CA: CPT

## 2023-09-21 PROCEDURE — 6360000002 HC RX W HCPCS: Performed by: PHYSICIAN ASSISTANT

## 2023-09-21 PROCEDURE — 96376 TX/PRO/DX INJ SAME DRUG ADON: CPT

## 2023-09-21 PROCEDURE — 99285 EMERGENCY DEPT VISIT HI MDM: CPT

## 2023-09-21 PROCEDURE — 87077 CULTURE AEROBIC IDENTIFY: CPT

## 2023-09-21 PROCEDURE — 81001 URINALYSIS AUTO W/SCOPE: CPT

## 2023-09-21 PROCEDURE — 96365 THER/PROPH/DIAG IV INF INIT: CPT

## 2023-09-21 PROCEDURE — 2580000003 HC RX 258: Performed by: FAMILY MEDICINE

## 2023-09-21 PROCEDURE — 6370000000 HC RX 637 (ALT 250 FOR IP): Performed by: FAMILY MEDICINE

## 2023-09-21 PROCEDURE — 74177 CT ABD & PELVIS W/CONTRAST: CPT

## 2023-09-21 PROCEDURE — 71046 X-RAY EXAM CHEST 2 VIEWS: CPT

## 2023-09-21 PROCEDURE — 87086 URINE CULTURE/COLONY COUNT: CPT

## 2023-09-21 PROCEDURE — 2580000003 HC RX 258: Performed by: PHYSICIAN ASSISTANT

## 2023-09-21 PROCEDURE — 1200000000 HC SEMI PRIVATE

## 2023-09-21 PROCEDURE — 84703 CHORIONIC GONADOTROPIN ASSAY: CPT

## 2023-09-21 PROCEDURE — 6360000004 HC RX CONTRAST MEDICATION: Performed by: PHYSICIAN ASSISTANT

## 2023-09-21 PROCEDURE — 6370000000 HC RX 637 (ALT 250 FOR IP): Performed by: PHYSICIAN ASSISTANT

## 2023-09-21 PROCEDURE — 96375 TX/PRO/DX INJ NEW DRUG ADDON: CPT

## 2023-09-21 PROCEDURE — 36415 COLL VENOUS BLD VENIPUNCTURE: CPT

## 2023-09-21 PROCEDURE — 85025 COMPLETE CBC W/AUTO DIFF WBC: CPT

## 2023-09-21 RX ORDER — TAMSULOSIN HYDROCHLORIDE 0.4 MG/1
0.4 CAPSULE ORAL DAILY
Status: DISCONTINUED | OUTPATIENT
Start: 2023-09-22 | End: 2023-09-26 | Stop reason: HOSPADM

## 2023-09-21 RX ORDER — ONDANSETRON 2 MG/ML
4 INJECTION INTRAMUSCULAR; INTRAVENOUS EVERY 6 HOURS PRN
Status: DISCONTINUED | OUTPATIENT
Start: 2023-09-21 | End: 2023-09-26 | Stop reason: HOSPADM

## 2023-09-21 RX ORDER — SODIUM CHLORIDE 0.9 % (FLUSH) 0.9 %
5-40 SYRINGE (ML) INJECTION PRN
Status: DISCONTINUED | OUTPATIENT
Start: 2023-09-21 | End: 2023-09-26 | Stop reason: HOSPADM

## 2023-09-21 RX ORDER — 0.9 % SODIUM CHLORIDE 0.9 %
1000 INTRAVENOUS SOLUTION INTRAVENOUS ONCE
Status: COMPLETED | OUTPATIENT
Start: 2023-09-21 | End: 2023-09-21

## 2023-09-21 RX ORDER — SODIUM CHLORIDE 0.9 % (FLUSH) 0.9 %
5-40 SYRINGE (ML) INJECTION EVERY 12 HOURS SCHEDULED
Status: DISCONTINUED | OUTPATIENT
Start: 2023-09-21 | End: 2023-09-26 | Stop reason: HOSPADM

## 2023-09-21 RX ORDER — ONDANSETRON 2 MG/ML
4 INJECTION INTRAMUSCULAR; INTRAVENOUS ONCE
Status: COMPLETED | OUTPATIENT
Start: 2023-09-21 | End: 2023-09-21

## 2023-09-21 RX ORDER — ORPHENADRINE CITRATE 30 MG/ML
60 INJECTION INTRAMUSCULAR; INTRAVENOUS ONCE
Status: COMPLETED | OUTPATIENT
Start: 2023-09-21 | End: 2023-09-21

## 2023-09-21 RX ORDER — POLYETHYLENE GLYCOL 3350 17 G/17G
17 POWDER, FOR SOLUTION ORAL DAILY PRN
Status: DISCONTINUED | OUTPATIENT
Start: 2023-09-21 | End: 2023-09-26 | Stop reason: HOSPADM

## 2023-09-21 RX ORDER — SODIUM CHLORIDE 9 MG/ML
INJECTION, SOLUTION INTRAVENOUS PRN
Status: DISCONTINUED | OUTPATIENT
Start: 2023-09-21 | End: 2023-09-26 | Stop reason: HOSPADM

## 2023-09-21 RX ORDER — ONDANSETRON 4 MG/1
4 TABLET, ORALLY DISINTEGRATING ORAL EVERY 8 HOURS PRN
Status: DISCONTINUED | OUTPATIENT
Start: 2023-09-21 | End: 2023-09-26 | Stop reason: HOSPADM

## 2023-09-21 RX ORDER — ACETAMINOPHEN 650 MG/1
650 SUPPOSITORY RECTAL EVERY 6 HOURS PRN
Status: DISCONTINUED | OUTPATIENT
Start: 2023-09-21 | End: 2023-09-26 | Stop reason: HOSPADM

## 2023-09-21 RX ORDER — KETOROLAC TROMETHAMINE 15 MG/ML
15 INJECTION, SOLUTION INTRAMUSCULAR; INTRAVENOUS ONCE
Status: COMPLETED | OUTPATIENT
Start: 2023-09-21 | End: 2023-09-21

## 2023-09-21 RX ORDER — MORPHINE SULFATE 4 MG/ML
4 INJECTION, SOLUTION INTRAMUSCULAR; INTRAVENOUS ONCE
Status: COMPLETED | OUTPATIENT
Start: 2023-09-21 | End: 2023-09-21

## 2023-09-21 RX ORDER — ACETAMINOPHEN 325 MG/1
650 TABLET ORAL EVERY 6 HOURS PRN
Status: DISCONTINUED | OUTPATIENT
Start: 2023-09-21 | End: 2023-09-26 | Stop reason: HOSPADM

## 2023-09-21 RX ORDER — LIDOCAINE 4 G/G
1 PATCH TOPICAL
Status: COMPLETED | OUTPATIENT
Start: 2023-09-21 | End: 2023-09-22

## 2023-09-21 RX ADMIN — ORPHENADRINE CITRATE 60 MG: 60 INJECTION INTRAMUSCULAR; INTRAVENOUS at 15:00

## 2023-09-21 RX ADMIN — SODIUM CHLORIDE 1000 ML: 9 INJECTION, SOLUTION INTRAVENOUS at 17:52

## 2023-09-21 RX ADMIN — CEFTRIAXONE 1000 MG: 1 INJECTION, POWDER, FOR SOLUTION INTRAMUSCULAR; INTRAVENOUS at 15:08

## 2023-09-21 RX ADMIN — MORPHINE SULFATE 4 MG: 4 INJECTION INTRAVENOUS at 17:40

## 2023-09-21 RX ADMIN — ACETAMINOPHEN 650 MG: 325 TABLET ORAL at 23:45

## 2023-09-21 RX ADMIN — ONDANSETRON 4 MG: 2 INJECTION INTRAMUSCULAR; INTRAVENOUS at 15:00

## 2023-09-21 RX ADMIN — SODIUM CHLORIDE 1000 ML: 9 INJECTION, SOLUTION INTRAVENOUS at 15:00

## 2023-09-21 RX ADMIN — SODIUM CHLORIDE, PRESERVATIVE FREE 10 ML: 5 INJECTION INTRAVENOUS at 22:36

## 2023-09-21 RX ADMIN — IOMEPROL INJECTION 100 ML: 714 INJECTION, SOLUTION INTRAVASCULAR at 14:50

## 2023-09-21 RX ADMIN — ONDANSETRON 4 MG: 2 INJECTION INTRAMUSCULAR; INTRAVENOUS at 17:40

## 2023-09-21 RX ADMIN — KETOROLAC TROMETHAMINE 15 MG: 15 INJECTION, SOLUTION INTRAMUSCULAR; INTRAVENOUS at 15:01

## 2023-09-21 ASSESSMENT — PAIN SCALES - GENERAL
PAINLEVEL_OUTOF10: 5
PAINLEVEL_OUTOF10: 7

## 2023-09-21 ASSESSMENT — LIFESTYLE VARIABLES: SMOKING_STATUS: 0

## 2023-09-21 ASSESSMENT — PAIN - FUNCTIONAL ASSESSMENT: PAIN_FUNCTIONAL_ASSESSMENT: 0-10

## 2023-09-21 NOTE — ED NOTES
Pt c/o right flank pain that started yesterday. Pt states the pain is so bad that she can't take a deep breath. Pt also states she is having urinary frequency and trouble emptying bladder x 2 weeks.       Marquis Kacie RN  09/21/23 3671

## 2023-09-22 ENCOUNTER — ANESTHESIA (OUTPATIENT)
Dept: OPERATING ROOM | Age: 23
End: 2023-09-22

## 2023-09-22 LAB — LACTATE BLDV-SCNC: 0.9 MMOL/L (ref 0.4–2)

## 2023-09-22 PROCEDURE — C1769 GUIDE WIRE: HCPCS | Performed by: UROLOGY

## 2023-09-22 PROCEDURE — 94760 N-INVAS EAR/PLS OXIMETRY 1: CPT

## 2023-09-22 PROCEDURE — 3600000002 HC SURGERY LEVEL 2 BASE: Performed by: UROLOGY

## 2023-09-22 PROCEDURE — 2500000003 HC RX 250 WO HCPCS

## 2023-09-22 PROCEDURE — 7100000001 HC PACU RECOVERY - ADDTL 15 MIN: Performed by: UROLOGY

## 2023-09-22 PROCEDURE — 3700000000 HC ANESTHESIA ATTENDED CARE: Performed by: UROLOGY

## 2023-09-22 PROCEDURE — 87040 BLOOD CULTURE FOR BACTERIA: CPT

## 2023-09-22 PROCEDURE — 36415 COLL VENOUS BLD VENIPUNCTURE: CPT

## 2023-09-22 PROCEDURE — 6370000000 HC RX 637 (ALT 250 FOR IP): Performed by: UROLOGY

## 2023-09-22 PROCEDURE — 87186 SC STD MICRODIL/AGAR DIL: CPT

## 2023-09-22 PROCEDURE — 3700000001 HC ADD 15 MINUTES (ANESTHESIA): Performed by: UROLOGY

## 2023-09-22 PROCEDURE — 7100000000 HC PACU RECOVERY - FIRST 15 MIN: Performed by: UROLOGY

## 2023-09-22 PROCEDURE — C2617 STENT, NON-COR, TEM W/O DEL: HCPCS | Performed by: UROLOGY

## 2023-09-22 PROCEDURE — 87150 DNA/RNA AMPLIFIED PROBE: CPT

## 2023-09-22 PROCEDURE — 2580000003 HC RX 258: Performed by: UROLOGY

## 2023-09-22 PROCEDURE — 6360000002 HC RX W HCPCS

## 2023-09-22 PROCEDURE — 2580000003 HC RX 258: Performed by: FAMILY MEDICINE

## 2023-09-22 PROCEDURE — C1758 CATHETER, URETERAL: HCPCS | Performed by: UROLOGY

## 2023-09-22 PROCEDURE — 2580000003 HC RX 258: Performed by: STUDENT IN AN ORGANIZED HEALTH CARE EDUCATION/TRAINING PROGRAM

## 2023-09-22 PROCEDURE — 3600000012 HC SURGERY LEVEL 2 ADDTL 15MIN: Performed by: UROLOGY

## 2023-09-22 PROCEDURE — 2709999900 HC NON-CHARGEABLE SUPPLY: Performed by: UROLOGY

## 2023-09-22 PROCEDURE — 6360000002 HC RX W HCPCS: Performed by: FAMILY MEDICINE

## 2023-09-22 PROCEDURE — 83605 ASSAY OF LACTIC ACID: CPT

## 2023-09-22 PROCEDURE — 1200000000 HC SEMI PRIVATE

## 2023-09-22 PROCEDURE — 0T768DZ DILATION OF RIGHT URETER WITH INTRALUMINAL DEVICE, VIA NATURAL OR ARTIFICIAL OPENING ENDOSCOPIC: ICD-10-PCS | Performed by: UROLOGY

## 2023-09-22 DEVICE — URETERAL STENT
Type: IMPLANTABLE DEVICE | Site: URETER | Status: FUNCTIONAL
Brand: CONTOUR™

## 2023-09-22 RX ORDER — PROPOFOL 10 MG/ML
INJECTION, EMULSION INTRAVENOUS CONTINUOUS PRN
Status: DISCONTINUED | OUTPATIENT
Start: 2023-09-22 | End: 2023-09-22 | Stop reason: SDUPTHER

## 2023-09-22 RX ORDER — MIDAZOLAM HYDROCHLORIDE 1 MG/ML
INJECTION INTRAMUSCULAR; INTRAVENOUS PRN
Status: DISCONTINUED | OUTPATIENT
Start: 2023-09-22 | End: 2023-09-22 | Stop reason: SDUPTHER

## 2023-09-22 RX ORDER — PROCHLORPERAZINE EDISYLATE 5 MG/ML
5 INJECTION INTRAMUSCULAR; INTRAVENOUS
Status: DISCONTINUED | OUTPATIENT
Start: 2023-09-22 | End: 2023-09-22 | Stop reason: HOSPADM

## 2023-09-22 RX ORDER — FENTANYL CITRATE 0.05 MG/ML
25 INJECTION, SOLUTION INTRAMUSCULAR; INTRAVENOUS EVERY 5 MIN PRN
Status: DISCONTINUED | OUTPATIENT
Start: 2023-09-22 | End: 2023-09-22 | Stop reason: HOSPADM

## 2023-09-22 RX ORDER — LABETALOL HYDROCHLORIDE 5 MG/ML
10 INJECTION, SOLUTION INTRAVENOUS
Status: DISCONTINUED | OUTPATIENT
Start: 2023-09-22 | End: 2023-09-22 | Stop reason: HOSPADM

## 2023-09-22 RX ORDER — ACETAMINOPHEN 500 MG
1000 TABLET ORAL ONCE
Status: DISCONTINUED | OUTPATIENT
Start: 2023-09-22 | End: 2023-09-22 | Stop reason: HOSPADM

## 2023-09-22 RX ORDER — LIDOCAINE HYDROCHLORIDE 20 MG/ML
INJECTION, SOLUTION EPIDURAL; INFILTRATION; INTRACAUDAL; PERINEURAL PRN
Status: DISCONTINUED | OUTPATIENT
Start: 2023-09-22 | End: 2023-09-22 | Stop reason: SDUPTHER

## 2023-09-22 RX ORDER — ONDANSETRON 2 MG/ML
4 INJECTION INTRAMUSCULAR; INTRAVENOUS
Status: DISCONTINUED | OUTPATIENT
Start: 2023-09-22 | End: 2023-09-22 | Stop reason: HOSPADM

## 2023-09-22 RX ORDER — HYDRALAZINE HYDROCHLORIDE 20 MG/ML
10 INJECTION INTRAMUSCULAR; INTRAVENOUS
Status: DISCONTINUED | OUTPATIENT
Start: 2023-09-22 | End: 2023-09-22 | Stop reason: HOSPADM

## 2023-09-22 RX ORDER — HYDROCODONE BITARTRATE AND ACETAMINOPHEN 5; 325 MG/1; MG/1
1 TABLET ORAL EVERY 6 HOURS PRN
Status: DISCONTINUED | OUTPATIENT
Start: 2023-09-22 | End: 2023-09-26 | Stop reason: HOSPADM

## 2023-09-22 RX ORDER — ONDANSETRON 2 MG/ML
4 INJECTION INTRAMUSCULAR; INTRAVENOUS ONCE
Status: DISCONTINUED | OUTPATIENT
Start: 2023-09-22 | End: 2023-09-22 | Stop reason: HOSPADM

## 2023-09-22 RX ORDER — SODIUM CHLORIDE, SODIUM LACTATE, POTASSIUM CHLORIDE, CALCIUM CHLORIDE 600; 310; 30; 20 MG/100ML; MG/100ML; MG/100ML; MG/100ML
INJECTION, SOLUTION INTRAVENOUS CONTINUOUS
Status: ACTIVE | OUTPATIENT
Start: 2023-09-22 | End: 2023-09-23

## 2023-09-22 RX ORDER — IPRATROPIUM BROMIDE AND ALBUTEROL SULFATE 2.5; .5 MG/3ML; MG/3ML
1 SOLUTION RESPIRATORY (INHALATION)
Status: DISCONTINUED | OUTPATIENT
Start: 2023-09-22 | End: 2023-09-22 | Stop reason: HOSPADM

## 2023-09-22 RX ORDER — SODIUM CHLORIDE 9 MG/ML
INJECTION, SOLUTION INTRAVENOUS PRN
Status: DISCONTINUED | OUTPATIENT
Start: 2023-09-22 | End: 2023-09-22 | Stop reason: HOSPADM

## 2023-09-22 RX ORDER — FENTANYL CITRATE 50 UG/ML
INJECTION, SOLUTION INTRAMUSCULAR; INTRAVENOUS PRN
Status: DISCONTINUED | OUTPATIENT
Start: 2023-09-22 | End: 2023-09-22 | Stop reason: SDUPTHER

## 2023-09-22 RX ORDER — ONDANSETRON 2 MG/ML
INJECTION INTRAMUSCULAR; INTRAVENOUS PRN
Status: DISCONTINUED | OUTPATIENT
Start: 2023-09-22 | End: 2023-09-22 | Stop reason: SDUPTHER

## 2023-09-22 RX ORDER — MAGNESIUM HYDROXIDE 1200 MG/15ML
LIQUID ORAL
Status: COMPLETED | OUTPATIENT
Start: 2023-09-22 | End: 2023-09-22

## 2023-09-22 RX ORDER — SODIUM CHLORIDE 0.9 % (FLUSH) 0.9 %
5-40 SYRINGE (ML) INJECTION EVERY 12 HOURS SCHEDULED
Status: DISCONTINUED | OUTPATIENT
Start: 2023-09-22 | End: 2023-09-22 | Stop reason: HOSPADM

## 2023-09-22 RX ORDER — SODIUM CHLORIDE 0.9 % (FLUSH) 0.9 %
5-40 SYRINGE (ML) INJECTION PRN
Status: DISCONTINUED | OUTPATIENT
Start: 2023-09-22 | End: 2023-09-22 | Stop reason: HOSPADM

## 2023-09-22 RX ORDER — FENTANYL CITRATE 0.05 MG/ML
50 INJECTION, SOLUTION INTRAMUSCULAR; INTRAVENOUS EVERY 5 MIN PRN
Status: DISCONTINUED | OUTPATIENT
Start: 2023-09-22 | End: 2023-09-22 | Stop reason: HOSPADM

## 2023-09-22 RX ADMIN — HYDROCODONE BITARTRATE AND ACETAMINOPHEN 1 TABLET: 5; 325 TABLET ORAL at 11:27

## 2023-09-22 RX ADMIN — SODIUM CHLORIDE 20 ML/HR: 9 INJECTION, SOLUTION INTRAVENOUS at 06:46

## 2023-09-22 RX ADMIN — TAMSULOSIN HYDROCHLORIDE 0.4 MG: 0.4 CAPSULE ORAL at 08:47

## 2023-09-22 RX ADMIN — MIDAZOLAM 2 MG: 1 INJECTION INTRAMUSCULAR; INTRAVENOUS at 07:19

## 2023-09-22 RX ADMIN — ACETAMINOPHEN 650 MG: 325 TABLET ORAL at 21:12

## 2023-09-22 RX ADMIN — CEFTRIAXONE SODIUM 2000 MG: 2 INJECTION, POWDER, FOR SOLUTION INTRAMUSCULAR; INTRAVENOUS at 06:49

## 2023-09-22 RX ADMIN — FENTANYL CITRATE 25 MCG: 50 INJECTION INTRAMUSCULAR; INTRAVENOUS at 07:30

## 2023-09-22 RX ADMIN — SODIUM CHLORIDE, POTASSIUM CHLORIDE, SODIUM LACTATE AND CALCIUM CHLORIDE: 600; 310; 30; 20 INJECTION, SOLUTION INTRAVENOUS at 14:14

## 2023-09-22 RX ADMIN — SODIUM CHLORIDE, PRESERVATIVE FREE 10 ML: 5 INJECTION INTRAVENOUS at 08:47

## 2023-09-22 RX ADMIN — PROPOFOL 250 MCG/KG/MIN: 10 INJECTION, EMULSION INTRAVENOUS at 07:25

## 2023-09-22 RX ADMIN — LIDOCAINE HYDROCHLORIDE 50 MG: 20 INJECTION, SOLUTION EPIDURAL; INFILTRATION; INTRACAUDAL; PERINEURAL at 07:25

## 2023-09-22 RX ADMIN — ONDANSETRON 4 MG: 2 INJECTION INTRAMUSCULAR; INTRAVENOUS at 07:46

## 2023-09-22 RX ADMIN — HYDROCODONE BITARTRATE AND ACETAMINOPHEN 1 TABLET: 5; 325 TABLET ORAL at 20:17

## 2023-09-22 RX ADMIN — FENTANYL CITRATE 25 MCG: 50 INJECTION INTRAMUSCULAR; INTRAVENOUS at 07:27

## 2023-09-22 RX ADMIN — FENTANYL CITRATE 50 MCG: 50 INJECTION INTRAMUSCULAR; INTRAVENOUS at 07:19

## 2023-09-22 ASSESSMENT — PAIN SCALES - GENERAL
PAINLEVEL_OUTOF10: 7
PAINLEVEL_OUTOF10: 0
PAINLEVEL_OUTOF10: 7

## 2023-09-22 ASSESSMENT — PAIN DESCRIPTION - DESCRIPTORS
DESCRIPTORS: CRAMPING
DESCRIPTORS: ACHING;DISCOMFORT
DESCRIPTORS: CRAMPING;SHARP

## 2023-09-22 ASSESSMENT — PAIN DESCRIPTION - ORIENTATION
ORIENTATION: RIGHT
ORIENTATION: RIGHT

## 2023-09-22 ASSESSMENT — PAIN DESCRIPTION - LOCATION
LOCATION: ABDOMEN;BACK
LOCATION: FLANK

## 2023-09-22 ASSESSMENT — PAIN - FUNCTIONAL ASSESSMENT
PAIN_FUNCTIONAL_ASSESSMENT: ACTIVITIES ARE NOT PREVENTED
PAIN_FUNCTIONAL_ASSESSMENT: PREVENTS OR INTERFERES SOME ACTIVE ACTIVITIES AND ADLS
PAIN_FUNCTIONAL_ASSESSMENT: 0-10
PAIN_FUNCTIONAL_ASSESSMENT: PREVENTS OR INTERFERES SOME ACTIVE ACTIVITIES AND ADLS

## 2023-09-22 NOTE — PLAN OF CARE
Problem: Discharge Planning  Goal: Discharge to home or other facility with appropriate resources  9/22/2023 0946 by Stan Stover RN  Outcome: Progressing  9/22/2023 0009 by Yuliya Busch RN  Outcome: Progressing  Flowsheets (Taken 9/21/2023 2233)  Discharge to home or other facility with appropriate resources:   Identify barriers to discharge with patient and caregiver   Identify discharge learning needs (meds, wound care, etc)     Problem: Pain  Goal: Verbalizes/displays adequate comfort level or baseline comfort level  9/22/2023 0946 by Stan Stover RN  Outcome: Progressing  9/22/2023 0009 by Yuliya Busch RN  Outcome: Progressing

## 2023-09-22 NOTE — H&P
V2.0  History and Physical      Name:  Beth Figueroa /Age/Sex: 2000  (20 y.o. female)   MRN & CSN:  [id number] & [id number] Encounter Date/Time: 2023 1:26 PM EDT   Location:  A3T-8530/4275-01 PCP: Galdino Austin, [address] [place] [address]  Day: 2    Assessment and Plan:   Beth Figueroa is a 20 y.o. female with a pmh of Nephrolithiasis who presents with Pyelonephritis    Past Medical History:   Diagnosis Date    ESBL (extended spectrum beta-lactamase) producing bacteria infection 2020    urine    Kidney stone        Hospital Problems             Last Modified POA    * (Principal) Pyelonephritis 2023 Yes       Plan:  Ureteral Stone:  Acute Pyelonephritis:  Leukocytosis:  -Admit to [facility]  -Tele  -c/w IV fluids  -c/w Ceftriaxone  -Follow Fever/ WBC count  -Follow Ucx/Bcx  -Urology consulted from ED, pt s/p Stent placement  -Pain control  -Antiemetics      Disposition:   Current Living situation: Home  Expected Disposition: Home  Estimated D/C: 1-2 days    Diet ADULT DIET; Regular   DVT Prophylaxis [x] Lovenox, []  Heparin, [] SCDs, [] Ambulation,  [] Eliquis, [] Xarelto, [] Coumadin   Code Status Full Code   Surrogate Decision Maker/ POA Self     Personally reviewed Lab Studies and Imaging     Discussed management of the case with Urology who recommended Stent placement    EKG interpreted personally and results no acute st/ t wave changes    Imaging that was interpreted personally includes CT abdomen and results 3-4mm obstructing R-UPJ stone    Drugs that require monitoring for toxicity include Ceftriaxone and the method of monitoring was monitoring for allergic reaction        History from:     patient    History of Present Illness:     Chief Complaint: Right Flank Pain  Beth Figueroa is a 20 y.o. female with pmh of Nephrolithiasis who presents with R sided worsening crampy/sharp flank pain w radiation to anterior abdomen, 10/10 on presentation, currently 0/10. VS stablle.  Labs w

## 2023-09-22 NOTE — ANESTHESIA POSTPROCEDURE EVALUATION
Department of Anesthesiology  Postprocedure Note    Patient: Yesenia Laguna  MRN: [id number]  [facility] 2000  Date of evaluation: 9/22/2023      Procedure Summary     Date: 09/22/23 Room / Location: [facility]    Anesthesia Start: [address] Anesthesia Stop: 7599    Procedure: CYSTOSCOPY RIGHT URETERAL STENT INSERTION (Right: Ureter) Diagnosis:       Ureterolithiasis      (Ureterolithiasis [N20.1])    Surgeons: Zaheer English MD Responsible Provider: Jordan Mata MD    Anesthesia Type: MAC ASA Status: 2          Anesthesia Type: MAC    Markel Phase I: Markel Score: 9    Markel Phase II:        Anesthesia Post Evaluation    Patient location during evaluation: PACU  Patient participation: complete - patient participated  Level of consciousness: awake  Airway patency: patent  Nausea & Vomiting: no nausea and no vomiting  Complications: no  Cardiovascular status: hemodynamically stable and blood pressure returned to baseline  Respiratory status: spontaneous ventilation, nonlabored ventilation and room air  Hydration status: stable  Comments: Uneventful MAC anesthetic. Ms. Gabriela May was seen comfortably conversing with staff following her procedure. Appropriate for return to floor.   Pain management: adequate

## 2023-09-22 NOTE — BRIEF OP NOTE
Brief Postoperative Note      Patient: Demetri Ernandez  YOB: 2000  MRN: [id number]    Date of Procedure: 9/22/2023    Pre-Op Diagnosis Codes:     * Ureterolithiasis [N20.1]  Right side  Post-Op Diagnosis: Same       Procedure(s):  CYSTOSCOPY RIGHT URETERAL STENT INSERTION    Surgeon(s):  Venkatesh Zapata MD    Assistant:  Surgical Assistant: Vivian Tsang    Anesthesia: Other    Estimated Blood Loss (mL): Minimal    Complications: None    Specimens:   * No specimens in log *    Implants:  * No implants in log *      Drains: * No LDAs found *    Findings: normal cysto      Electronically signed by Venkatesh Zapata MD on 9/22/2023 at 7:52 AM

## 2023-09-22 NOTE — OP NOTE
[address], [facility] [address]                                OPERATIVE REPORT    PATIENT NAME: Abiodun Young                   :        2000  MED REC NO:   [id number]                          ROOM:       8858  ACCOUNT NO:   [de-identified]                           ADMIT DATE: 2023  PROVIDER:     Gumaro Garner MD    DATE OF PROCEDURE:  2023    PREOPERATIVE DIAGNOSIS:  Right ureteral calculus, UTI. POSTOPERATIVE DIAGNOSIS:  Right ureteral calculus, UTI. OPERATION PERFORMED:  Cystoscopy, right ureteral stent insertion  6-Cook Islander x 24 cm. SURGEON:  Gumaro Garner MD    ANESTHESIA:  TIVA. COMPLICATIONS:  None. BLOOD LOSS:  Minimal.    INDICATION:  A 75-year-old female presented with right flank pain. CT  scan showed a 4 mm proximal stone. Urinalysis was concerning for  infection. She was afebrile on presentation, but did spike a fever  overnight last night. She is here for stent insertion. OPERATIVE PROCEDURE:  She is already on scheduled Rocephin. She was  taken to the cystoscopy suite. She moved onto the table. Anesthesia  was induced. Her position was changed to the lithotomy position. Her  genitalia were prepped and draped. The 21-[place]er cystoscope advanced  easily through the urethra into the bladder. The bladder showed no  abnormalities. A 0.035 ZIPwire was advanced into the right ureteral  orifice up to the kidney. This was confirmed under fluoroscopy. A  6-[place]er x 24 cm double-J stent was then advanced over the wire in the  typical fashion. The wire was withdrawn. Fluoroscopy showed the stent  was in the appropriate position. The bladder was then drained and the  scope was withdrawn. The patient was then awakened and transferred to  Recovery having tolerated the procedure well. POSTOPERATIVE PLAN:  She will be admitted overnight and continue  antibiotics.

## 2023-09-22 NOTE — PLAN OF CARE
Problem: Discharge Planning  Goal: Discharge to home or other facility with appropriate resources  Outcome: Progressing  Flowsheets (Taken 9/21/2023 2233)  Discharge to home or other facility with appropriate resources:   Identify barriers to discharge with patient and caregiver   Identify discharge learning needs (meds, wound care, etc)     Problem: Pain  Goal: Verbalizes/displays adequate comfort level or baseline comfort level  Outcome: Progressing

## 2023-09-23 LAB
ANION GAP SERPL CALCULATED.3IONS-SCNC: 8 MMOL/L (ref 3–16)
BACTERIA UR CULT: ABNORMAL
BASOPHILS # BLD: 0.1 K/UL (ref 0–0.2)
BASOPHILS NFR BLD: 0.7 %
BUN SERPL-MCNC: 8 MG/DL (ref 7–20)
CALCIUM SERPL-MCNC: 10 MG/DL (ref 8.3–10.6)
CHLORIDE SERPL-SCNC: 105 MMOL/L (ref 99–110)
CO2 SERPL-SCNC: 24 MMOL/L (ref 21–32)
CREAT SERPL-MCNC: 0.7 MG/DL (ref 0.6–1.1)
DEPRECATED RDW RBC AUTO: 12.9 % (ref 12.4–15.4)
EOSINOPHIL # BLD: 0.1 K/UL (ref 0–0.6)
EOSINOPHIL NFR BLD: 0.8 %
GFR SERPLBLD CREATININE-BSD FMLA CKD-EPI: >60 ML/MIN/{1.73_M2}
GLUCOSE SERPL-MCNC: 100 MG/DL (ref 70–99)
HCT VFR BLD AUTO: 35.5 % (ref 36–48)
HGB BLD-MCNC: 11.9 G/DL (ref 12–16)
LYMPHOCYTES # BLD: 1.9 K/UL (ref 1–5.1)
LYMPHOCYTES NFR BLD: 18.6 %
MCH RBC QN AUTO: 30.8 PG (ref 26–34)
MCHC RBC AUTO-ENTMCNC: 33.5 G/DL (ref 31–36)
MCV RBC AUTO: 91.8 FL (ref 80–100)
MONOCYTES # BLD: 0.9 K/UL (ref 0–1.3)
MONOCYTES NFR BLD: 9 %
NEUTROPHILS # BLD: 7.2 K/UL (ref 1.7–7.7)
NEUTROPHILS NFR BLD: 70.9 %
ORGANISM: ABNORMAL
PLATELET # BLD AUTO: 204 K/UL (ref 135–450)
PMV BLD AUTO: 9.1 FL (ref 5–10.5)
POTASSIUM SERPL-SCNC: 4 MMOL/L (ref 3.5–5.1)
RBC # BLD AUTO: 3.87 M/UL (ref 4–5.2)
REPORT: NORMAL
SODIUM SERPL-SCNC: 137 MMOL/L (ref 136–145)
WBC # BLD AUTO: 10.1 K/UL (ref 4–11)

## 2023-09-23 PROCEDURE — 6370000000 HC RX 637 (ALT 250 FOR IP): Performed by: UROLOGY

## 2023-09-23 PROCEDURE — 94760 N-INVAS EAR/PLS OXIMETRY 1: CPT

## 2023-09-23 PROCEDURE — 2580000003 HC RX 258: Performed by: INTERNAL MEDICINE

## 2023-09-23 PROCEDURE — 6360000002 HC RX W HCPCS: Performed by: UROLOGY

## 2023-09-23 PROCEDURE — 1200000000 HC SEMI PRIVATE

## 2023-09-23 PROCEDURE — 36415 COLL VENOUS BLD VENIPUNCTURE: CPT

## 2023-09-23 PROCEDURE — 99255 IP/OBS CONSLTJ NEW/EST HI 80: CPT | Performed by: INTERNAL MEDICINE

## 2023-09-23 PROCEDURE — 85025 COMPLETE CBC W/AUTO DIFF WBC: CPT

## 2023-09-23 PROCEDURE — 2580000003 HC RX 258: Performed by: UROLOGY

## 2023-09-23 PROCEDURE — 80048 BASIC METABOLIC PNL TOTAL CA: CPT

## 2023-09-23 PROCEDURE — 6360000002 HC RX W HCPCS: Performed by: INTERNAL MEDICINE

## 2023-09-23 RX ORDER — MORPHINE SULFATE 2 MG/ML
2 INJECTION, SOLUTION INTRAMUSCULAR; INTRAVENOUS
Status: DISCONTINUED | OUTPATIENT
Start: 2023-09-23 | End: 2023-09-26 | Stop reason: HOSPADM

## 2023-09-23 RX ORDER — VANCOMYCIN 1.75 G/350ML
1250 INJECTION, SOLUTION INTRAVENOUS EVERY 12 HOURS
Status: DISCONTINUED | OUTPATIENT
Start: 2023-09-24 | End: 2023-09-25

## 2023-09-23 RX ORDER — MORPHINE SULFATE 4 MG/ML
4 INJECTION, SOLUTION INTRAMUSCULAR; INTRAVENOUS
Status: DISCONTINUED | OUTPATIENT
Start: 2023-09-23 | End: 2023-09-26 | Stop reason: HOSPADM

## 2023-09-23 RX ADMIN — CEFTRIAXONE SODIUM 2000 MG: 2 INJECTION, POWDER, FOR SOLUTION INTRAMUSCULAR; INTRAVENOUS at 05:36

## 2023-09-23 RX ADMIN — VANCOMYCIN HYDROCHLORIDE 1500 MG: 1.5 INJECTION, POWDER, LYOPHILIZED, FOR SOLUTION INTRAVENOUS at 20:36

## 2023-09-23 RX ADMIN — SODIUM CHLORIDE, PRESERVATIVE FREE 10 ML: 5 INJECTION INTRAVENOUS at 20:31

## 2023-09-23 RX ADMIN — MORPHINE SULFATE 4 MG: 4 INJECTION, SOLUTION INTRAMUSCULAR; INTRAVENOUS at 20:27

## 2023-09-23 RX ADMIN — HYDROCODONE BITARTRATE AND ACETAMINOPHEN 1 TABLET: 5; 325 TABLET ORAL at 04:30

## 2023-09-23 RX ADMIN — HYDROCODONE BITARTRATE AND ACETAMINOPHEN 1 TABLET: 5; 325 TABLET ORAL at 22:07

## 2023-09-23 RX ADMIN — ACETAMINOPHEN 650 MG: 325 TABLET ORAL at 04:31

## 2023-09-23 RX ADMIN — HYDROCODONE BITARTRATE AND ACETAMINOPHEN 1 TABLET: 5; 325 TABLET ORAL at 13:10

## 2023-09-23 RX ADMIN — TAMSULOSIN HYDROCHLORIDE 0.4 MG: 0.4 CAPSULE ORAL at 07:44

## 2023-09-23 RX ADMIN — SODIUM CHLORIDE, PRESERVATIVE FREE 10 ML: 5 INJECTION INTRAVENOUS at 07:45

## 2023-09-23 RX ADMIN — ONDANSETRON 4 MG: 2 INJECTION INTRAMUSCULAR; INTRAVENOUS at 13:05

## 2023-09-23 ASSESSMENT — PAIN SCALES - GENERAL
PAINLEVEL_OUTOF10: 4
PAINLEVEL_OUTOF10: 7
PAINLEVEL_OUTOF10: 8
PAINLEVEL_OUTOF10: 3
PAINLEVEL_OUTOF10: 7
PAINLEVEL_OUTOF10: 8

## 2023-09-23 ASSESSMENT — PAIN - FUNCTIONAL ASSESSMENT
PAIN_FUNCTIONAL_ASSESSMENT: ACTIVITIES ARE NOT PREVENTED

## 2023-09-23 ASSESSMENT — PAIN DESCRIPTION - ORIENTATION
ORIENTATION: RIGHT

## 2023-09-23 ASSESSMENT — PAIN DESCRIPTION - LOCATION
LOCATION: FLANK
LOCATION: ABDOMEN;BACK
LOCATION: ABDOMEN;BACK
LOCATION: ABDOMEN

## 2023-09-23 ASSESSMENT — PAIN DESCRIPTION - ONSET
ONSET: ON-GOING
ONSET: ON-GOING

## 2023-09-23 ASSESSMENT — PAIN DESCRIPTION - PAIN TYPE
TYPE: ACUTE PAIN
TYPE: ACUTE PAIN

## 2023-09-23 ASSESSMENT — PAIN DESCRIPTION - DESCRIPTORS
DESCRIPTORS: CRUSHING;SHARP
DESCRIPTORS: SHARP;CRAMPING
DESCRIPTORS: ACHING
DESCRIPTORS: ACHING;SQUEEZING

## 2023-09-23 ASSESSMENT — PAIN DESCRIPTION - FREQUENCY
FREQUENCY: CONTINUOUS
FREQUENCY: CONTINUOUS

## 2023-09-23 NOTE — PLAN OF CARE
Problem: Discharge Planning  Goal: Discharge to home or other facility with appropriate resources  Outcome: Progressing  Flowsheets (Taken 9/22/2023 2030)  Discharge to home or other facility with appropriate resources:   Identify barriers to discharge with patient and caregiver   Arrange for needed discharge resources and transportation as appropriate     Problem: Pain  Goal: Verbalizes/displays adequate comfort level or baseline comfort level  Outcome: Progressing     Problem: Skin/Tissue Integrity - Adult  Goal: Skin integrity remains intact  Outcome: Progressing  Flowsheets (Taken 9/22/2023 2030)  Skin Integrity Remains Intact: Monitor for areas of redness and/or skin breakdown     Problem: Genitourinary - Adult  Goal: Absence of urinary retention  Outcome: Progressing  Flowsheets (Taken 9/22/2023 2030)  Absence of urinary retention: Monitor intake/output and perform bladder scan as needed     Problem: Infection - Adult  Goal: Absence of infection at discharge  Outcome: Progressing  Flowsheets (Taken 9/22/2023 2030)  Absence of infection at discharge: Assess and monitor for signs and symptoms of infection     Problem: Metabolic/Fluid and Electrolytes - Adult  Goal: Electrolytes maintained within normal limits  Outcome: Progressing  Flowsheets (Taken 9/22/2023 2030)  Electrolytes maintained within normal limits: Monitor labs and assess patient for signs and symptoms of electrolyte imbalances

## 2023-09-23 NOTE — PLAN OF CARE
Problem: Discharge Planning  Goal: Discharge to home or other facility with appropriate resources  9/23/2023 1000 by Nam Felix RN  Outcome: Progressing    Flowsheets (Taken 9/22/2023 2030)  Discharge to home or other facility with appropriate resources:   Identify barriers to discharge with patient and caregiver   Arrange for needed discharge resources and transportation as appropriate     Problem: Pain  Goal: Verbalizes/displays adequate comfort level or baseline comfort level  9/23/2023 1000 by Nam Felix RN  Outcome: Progressing       Problem: Skin/Tissue Integrity - Adult  Goal: Skin integrity remains intact  9/23/2023 1000 by Nam Felix RN  Outcome: Progressing    Flowsheets (Taken 9/22/2023 2030)  Skin Integrity Remains Intact: Monitor for areas of redness and/or skin breakdown     Problem: Genitourinary - Adult  Goal: Absence of urinary retention  9/23/2023 1000 by Nam Felix RN  Outcome: Progressing    Flowsheets (Taken 9/22/2023 2030)  Absence of urinary retention: Monitor intake/output and perform bladder scan as needed     Problem: Infection - Adult  Goal: Absence of infection at discharge  9/23/2023 1000 by Nam Felix RN  Outcome: Progressing    Flowsheets (Taken 9/22/2023 2030)  Absence of infection at discharge: Assess and monitor for signs and symptoms of infection     Problem: Metabolic/Fluid and Electrolytes - Adult  Goal: Electrolytes maintained within normal limits  9/23/2023 1000 by Nam Felix RN  Outcome: Progressing    Flowsheets (Taken 9/22/2023 2030)  Electrolytes maintained within normal limits: Monitor labs and assess patient for signs and symptoms of electrolyte imbalances

## 2023-09-24 PROBLEM — R78.81 BACTEREMIA: Status: ACTIVE | Noted: 2023-09-24

## 2023-09-24 PROBLEM — A41.9 SEPSIS WITHOUT ACUTE ORGAN DYSFUNCTION (HCC): Status: ACTIVE | Noted: 2023-09-24

## 2023-09-24 PROBLEM — A49.8 KLEBSIELLA INFECTION: Status: ACTIVE | Noted: 2023-09-24

## 2023-09-24 PROBLEM — N20.0 KIDNEY STONES: Status: ACTIVE | Noted: 2023-09-24

## 2023-09-24 PROBLEM — N20.1 URETEROLITHIASIS: Status: ACTIVE | Noted: 2023-09-24

## 2023-09-24 PROBLEM — N10 ACUTE PYELONEPHRITIS: Status: ACTIVE | Noted: 2023-09-24

## 2023-09-24 PROBLEM — R50.9 FEVER AND CHILLS: Status: ACTIVE | Noted: 2023-09-24

## 2023-09-24 LAB
ANION GAP SERPL CALCULATED.3IONS-SCNC: 7 MMOL/L (ref 3–16)
BUN SERPL-MCNC: 8 MG/DL (ref 7–20)
CALCIUM SERPL-MCNC: 9.4 MG/DL (ref 8.3–10.6)
CHLORIDE SERPL-SCNC: 107 MMOL/L (ref 99–110)
CO2 SERPL-SCNC: 24 MMOL/L (ref 21–32)
CREAT SERPL-MCNC: 0.7 MG/DL (ref 0.6–1.1)
DEPRECATED RDW RBC AUTO: 13.4 % (ref 12.4–15.4)
GFR SERPLBLD CREATININE-BSD FMLA CKD-EPI: >60 ML/MIN/{1.73_M2}
GLUCOSE SERPL-MCNC: 98 MG/DL (ref 70–99)
HCT VFR BLD AUTO: 36.6 % (ref 36–48)
HGB BLD-MCNC: 12.1 G/DL (ref 12–16)
MCH RBC QN AUTO: 30.4 PG (ref 26–34)
MCHC RBC AUTO-ENTMCNC: 32.9 G/DL (ref 31–36)
MCV RBC AUTO: 92.3 FL (ref 80–100)
PLATELET # BLD AUTO: 240 K/UL (ref 135–450)
PMV BLD AUTO: 9 FL (ref 5–10.5)
POTASSIUM SERPL-SCNC: 4.3 MMOL/L (ref 3.5–5.1)
RBC # BLD AUTO: 3.97 M/UL (ref 4–5.2)
SODIUM SERPL-SCNC: 138 MMOL/L (ref 136–145)
WBC # BLD AUTO: 6.1 K/UL (ref 4–11)

## 2023-09-24 PROCEDURE — 80048 BASIC METABOLIC PNL TOTAL CA: CPT

## 2023-09-24 PROCEDURE — 99233 SBSQ HOSP IP/OBS HIGH 50: CPT | Performed by: INTERNAL MEDICINE

## 2023-09-24 PROCEDURE — 6360000002 HC RX W HCPCS: Performed by: UROLOGY

## 2023-09-24 PROCEDURE — 2580000003 HC RX 258: Performed by: UROLOGY

## 2023-09-24 PROCEDURE — 85027 COMPLETE CBC AUTOMATED: CPT

## 2023-09-24 PROCEDURE — 1200000000 HC SEMI PRIVATE

## 2023-09-24 PROCEDURE — 6360000002 HC RX W HCPCS: Performed by: INTERNAL MEDICINE

## 2023-09-24 PROCEDURE — 6370000000 HC RX 637 (ALT 250 FOR IP): Performed by: UROLOGY

## 2023-09-24 PROCEDURE — 36415 COLL VENOUS BLD VENIPUNCTURE: CPT

## 2023-09-24 RX ADMIN — VANCOMYCIN 1250 MG: 1.75 INJECTION, SOLUTION INTRAVENOUS at 20:17

## 2023-09-24 RX ADMIN — VANCOMYCIN 1250 MG: 1.75 INJECTION, SOLUTION INTRAVENOUS at 08:00

## 2023-09-24 RX ADMIN — HYDROCODONE BITARTRATE AND ACETAMINOPHEN 1 TABLET: 5; 325 TABLET ORAL at 18:00

## 2023-09-24 RX ADMIN — TAMSULOSIN HYDROCHLORIDE 0.4 MG: 0.4 CAPSULE ORAL at 07:58

## 2023-09-24 RX ADMIN — SODIUM CHLORIDE, PRESERVATIVE FREE 10 ML: 5 INJECTION INTRAVENOUS at 20:18

## 2023-09-24 RX ADMIN — HYDROCODONE BITARTRATE AND ACETAMINOPHEN 1 TABLET: 5; 325 TABLET ORAL at 04:04

## 2023-09-24 RX ADMIN — CEFTRIAXONE SODIUM 2000 MG: 2 INJECTION, POWDER, FOR SOLUTION INTRAMUSCULAR; INTRAVENOUS at 06:37

## 2023-09-24 ASSESSMENT — PAIN SCALES - GENERAL
PAINLEVEL_OUTOF10: 2
PAINLEVEL_OUTOF10: 8
PAINLEVEL_OUTOF10: 0
PAINLEVEL_OUTOF10: 7

## 2023-09-24 ASSESSMENT — PAIN DESCRIPTION - PAIN TYPE: TYPE: ACUTE PAIN

## 2023-09-24 ASSESSMENT — PAIN DESCRIPTION - ONSET: ONSET: ON-GOING

## 2023-09-24 ASSESSMENT — PAIN DESCRIPTION - LOCATION: LOCATION: ABDOMEN;BACK

## 2023-09-24 ASSESSMENT — PAIN DESCRIPTION - FREQUENCY: FREQUENCY: CONTINUOUS

## 2023-09-24 ASSESSMENT — PAIN DESCRIPTION - DESCRIPTORS: DESCRIPTORS: SHARP

## 2023-09-24 ASSESSMENT — PAIN DESCRIPTION - ORIENTATION: ORIENTATION: RIGHT

## 2023-09-24 ASSESSMENT — PAIN - FUNCTIONAL ASSESSMENT: PAIN_FUNCTIONAL_ASSESSMENT: ACTIVITIES ARE NOT PREVENTED

## 2023-09-24 NOTE — PLAN OF CARE
Problem: Discharge Planning  Goal: Discharge to home or other facility with appropriate resources  9/23/2023 2343 by Viktoria Hubbard RN  Outcome: Progressing  9/23/2023 1000 by Bella Murillo RN  Outcome: Progressing     Problem: Pain  Goal: Verbalizes/displays adequate comfort level or baseline comfort level  9/23/2023 2343 by Viktoria Hubbard RN  Outcome: Progressing  9/23/2023 1000 by Bella Murillo RN  Outcome: Progressing     Problem: Skin/Tissue Integrity - Adult  Goal: Skin integrity remains intact  9/23/2023 2343 by Viktoria Hubbard RN  Outcome: Progressing  9/23/2023 1000 by Bella Murillo RN  Outcome: Progressing     Problem: Genitourinary - Adult  Goal: Absence of urinary retention  9/23/2023 2343 by Viktoria Hubbard RN  Outcome: Progressing  9/23/2023 1000 by Bella Murillo RN  Outcome: Progressing     Problem: Infection - Adult  Goal: Absence of infection at discharge  9/23/2023 2343 by Viktoria Hubbard RN  Outcome: Progressing  9/23/2023 1000 by Bella Murillo RN  Outcome: Progressing     Problem: Metabolic/Fluid and Electrolytes - Adult  Goal: Electrolytes maintained within normal limits  9/23/2023 2343 by Viktoria Hubbard RN  Outcome: Progressing  9/23/2023 1000 by Bella Murillo RN  Outcome: Progressing

## 2023-09-24 NOTE — FLOWSHEET NOTE
Patient complained of right side ABD pain that radiates to back flank area Prn Morphine and Norco was given per order with effects noted. Patient had a shower and stated she got her IV wet and it was coming out. IV flushed and is intact and dressing was changed. Mom is at bedside.

## 2023-09-24 NOTE — PLAN OF CARE
Problem: Discharge Planning  Goal: Discharge to home or other facility with appropriate resources  9/24/2023 1037 by Enrrique Cedillo RN  Outcome: Progressing     Problem: Pain  Goal: Verbalizes/displays adequate comfort level or baseline comfort level  9/24/2023 1037 by Enrrique Cedillo RN  Outcome: Progressing     Problem: Skin/Tissue Integrity - Adult  Goal: Skin integrity remains intact  9/24/2023 1037 by Enrrique Cedillo RN  Outcome: Progressing  Flowsheets (Taken 9/23/2023 2344 by Mulugeta Church RN)  Skin Integrity Remains Intact:   Assess vascular access sites hourly   Monitor for areas of redness and/or skin breakdown     Problem: Genitourinary - Adult  Goal: Absence of urinary retention  9/24/2023 1037 by Enrrique Cedillo RN  Outcome: Progressing     Problem: Infection - Adult  Goal: Absence of infection at discharge  9/24/2023 1037 by Enrrique Cedillo RN  Outcome: Progressing     Problem: Metabolic/Fluid and Electrolytes - Adult  Goal: Electrolytes maintained within normal limits  9/24/2023 1037 by Enrrique Cedillo RN  Outcome: Progressing

## 2023-09-25 LAB
BACTERIA BLD CULT ORG #2: ABNORMAL
BACTERIA BLD CULT ORG #2: ABNORMAL
BACTERIA BLD CULT: ABNORMAL
ORGANISM: ABNORMAL
VANCOMYCIN TROUGH SERPL-MCNC: 9.3 UG/ML (ref 10–20)

## 2023-09-25 PROCEDURE — 2580000003 HC RX 258: Performed by: INTERNAL MEDICINE

## 2023-09-25 PROCEDURE — 1200000000 HC SEMI PRIVATE

## 2023-09-25 PROCEDURE — 2580000003 HC RX 258: Performed by: UROLOGY

## 2023-09-25 PROCEDURE — 6360000002 HC RX W HCPCS: Performed by: INTERNAL MEDICINE

## 2023-09-25 PROCEDURE — 6360000002 HC RX W HCPCS: Performed by: UROLOGY

## 2023-09-25 PROCEDURE — 6370000000 HC RX 637 (ALT 250 FOR IP): Performed by: UROLOGY

## 2023-09-25 PROCEDURE — 87040 BLOOD CULTURE FOR BACTERIA: CPT

## 2023-09-25 PROCEDURE — 36415 COLL VENOUS BLD VENIPUNCTURE: CPT

## 2023-09-25 PROCEDURE — 80202 ASSAY OF VANCOMYCIN: CPT

## 2023-09-25 PROCEDURE — 99232 SBSQ HOSP IP/OBS MODERATE 35: CPT | Performed by: INTERNAL MEDICINE

## 2023-09-25 RX ADMIN — VANCOMYCIN HYDROCHLORIDE 1500 MG: 1.5 INJECTION, POWDER, LYOPHILIZED, FOR SOLUTION INTRAVENOUS at 20:19

## 2023-09-25 RX ADMIN — TAMSULOSIN HYDROCHLORIDE 0.4 MG: 0.4 CAPSULE ORAL at 09:15

## 2023-09-25 RX ADMIN — VANCOMYCIN HYDROCHLORIDE 1500 MG: 1.5 INJECTION, POWDER, LYOPHILIZED, FOR SOLUTION INTRAVENOUS at 09:23

## 2023-09-25 RX ADMIN — CEFTRIAXONE SODIUM 2000 MG: 2 INJECTION, POWDER, FOR SOLUTION INTRAMUSCULAR; INTRAVENOUS at 06:03

## 2023-09-25 RX ADMIN — SODIUM CHLORIDE, PRESERVATIVE FREE 10 ML: 5 INJECTION INTRAVENOUS at 09:15

## 2023-09-25 RX ADMIN — MORPHINE SULFATE 4 MG: 4 INJECTION, SOLUTION INTRAMUSCULAR; INTRAVENOUS at 04:37

## 2023-09-25 RX ADMIN — SODIUM CHLORIDE, PRESERVATIVE FREE 10 ML: 5 INJECTION INTRAVENOUS at 20:17

## 2023-09-25 RX ADMIN — HYDROCODONE BITARTRATE AND ACETAMINOPHEN 1 TABLET: 5; 325 TABLET ORAL at 12:41

## 2023-09-25 ASSESSMENT — PAIN SCALES - GENERAL
PAINLEVEL_OUTOF10: 4
PAINLEVEL_OUTOF10: 3
PAINLEVEL_OUTOF10: 2
PAINLEVEL_OUTOF10: 0
PAINLEVEL_OUTOF10: 8
PAINLEVEL_OUTOF10: 4
PAINLEVEL_OUTOF10: 7
PAINLEVEL_OUTOF10: 4

## 2023-09-25 ASSESSMENT — PAIN - FUNCTIONAL ASSESSMENT
PAIN_FUNCTIONAL_ASSESSMENT: ACTIVITIES ARE NOT PREVENTED

## 2023-09-25 ASSESSMENT — PAIN DESCRIPTION - DESCRIPTORS
DESCRIPTORS: CRAMPING
DESCRIPTORS: BURNING;SHARP;THROBBING
DESCRIPTORS: ACHING
DESCRIPTORS: CRAMPING

## 2023-09-25 ASSESSMENT — PAIN DESCRIPTION - LOCATION
LOCATION: BACK
LOCATION: HAND
LOCATION: ABDOMEN;BACK
LOCATION: BACK

## 2023-09-25 ASSESSMENT — PAIN DESCRIPTION - ONSET
ONSET: ON-GOING
ONSET: ON-GOING

## 2023-09-25 ASSESSMENT — PAIN DESCRIPTION - PAIN TYPE
TYPE: ACUTE PAIN
TYPE: ACUTE PAIN

## 2023-09-25 ASSESSMENT — PAIN DESCRIPTION - ORIENTATION
ORIENTATION: MID
ORIENTATION: RIGHT;LOWER
ORIENTATION: RIGHT

## 2023-09-25 ASSESSMENT — PAIN DESCRIPTION - FREQUENCY
FREQUENCY: CONTINUOUS
FREQUENCY: CONTINUOUS

## 2023-09-25 NOTE — PLAN OF CARE
Problem: Discharge Planning  Goal: Discharge to home or other facility with appropriate resources  9/25/2023 0006 by Jose Luis Moreno RN  Outcome: Progressing     Problem: Pain  Goal: Verbalizes/displays adequate comfort level or baseline comfort level  9/25/2023 0006 by Jose Luis Moreno RN  Outcome: Progressing     Problem: Skin/Tissue Integrity - Adult  Goal: Skin integrity remains intact  9/25/2023 0006 by Jose Luis Moreno RN  Outcome: Progressing     Problem: Genitourinary - Adult  Goal: Absence of urinary retention  9/25/2023 0006 by Jose Luis Moreno RN  Outcome: Progressing     Problem: Infection - Adult  Goal: Absence of infection at discharge  9/25/2023 0006 by Jose Luis Moreno RN  Outcome: Progressing     Problem: Metabolic/Fluid and Electrolytes - Adult  Goal: Electrolytes maintained within normal limits  9/25/2023 0006 by Jose Luis Moreno RN  Outcome: Progressing

## 2023-09-26 VITALS
DIASTOLIC BLOOD PRESSURE: 71 MMHG | BODY MASS INDEX: 29.1 KG/M2 | WEIGHT: 164.24 LBS | SYSTOLIC BLOOD PRESSURE: 109 MMHG | TEMPERATURE: 98.1 F | HEIGHT: 63 IN | OXYGEN SATURATION: 95 % | HEART RATE: 64 BPM | RESPIRATION RATE: 18 BRPM

## 2023-09-26 LAB
ANION GAP SERPL CALCULATED.3IONS-SCNC: 9 MMOL/L (ref 3–16)
BUN SERPL-MCNC: 12 MG/DL (ref 7–20)
CALCIUM SERPL-MCNC: 10 MG/DL (ref 8.3–10.6)
CHLORIDE SERPL-SCNC: 104 MMOL/L (ref 99–110)
CO2 SERPL-SCNC: 25 MMOL/L (ref 21–32)
CREAT SERPL-MCNC: 0.6 MG/DL (ref 0.6–1.1)
DEPRECATED RDW RBC AUTO: 13.3 % (ref 12.4–15.4)
GFR SERPLBLD CREATININE-BSD FMLA CKD-EPI: >60 ML/MIN/{1.73_M2}
GLUCOSE SERPL-MCNC: 98 MG/DL (ref 70–99)
HCT VFR BLD AUTO: 38.2 % (ref 36–48)
HGB BLD-MCNC: 12.7 G/DL (ref 12–16)
MCH RBC QN AUTO: 30.5 PG (ref 26–34)
MCHC RBC AUTO-ENTMCNC: 33.3 G/DL (ref 31–36)
MCV RBC AUTO: 91.4 FL (ref 80–100)
PLATELET # BLD AUTO: 330 K/UL (ref 135–450)
PMV BLD AUTO: 8.8 FL (ref 5–10.5)
POTASSIUM SERPL-SCNC: 4.1 MMOL/L (ref 3.5–5.1)
RBC # BLD AUTO: 4.18 M/UL (ref 4–5.2)
SODIUM SERPL-SCNC: 138 MMOL/L (ref 136–145)
WBC # BLD AUTO: 6.6 K/UL (ref 4–11)

## 2023-09-26 PROCEDURE — 6370000000 HC RX 637 (ALT 250 FOR IP): Performed by: UROLOGY

## 2023-09-26 PROCEDURE — 85027 COMPLETE CBC AUTOMATED: CPT

## 2023-09-26 PROCEDURE — 6360000002 HC RX W HCPCS: Performed by: UROLOGY

## 2023-09-26 PROCEDURE — 36415 COLL VENOUS BLD VENIPUNCTURE: CPT

## 2023-09-26 PROCEDURE — 80048 BASIC METABOLIC PNL TOTAL CA: CPT

## 2023-09-26 PROCEDURE — 2580000003 HC RX 258: Performed by: UROLOGY

## 2023-09-26 PROCEDURE — 99232 SBSQ HOSP IP/OBS MODERATE 35: CPT | Performed by: INTERNAL MEDICINE

## 2023-09-26 PROCEDURE — 6370000000 HC RX 637 (ALT 250 FOR IP): Performed by: INTERNAL MEDICINE

## 2023-09-26 RX ORDER — SULFAMETHOXAZOLE AND TRIMETHOPRIM 800; 160 MG/1; MG/1
1 TABLET ORAL EVERY 12 HOURS SCHEDULED
Status: DISCONTINUED | OUTPATIENT
Start: 2023-09-26 | End: 2023-09-26 | Stop reason: HOSPADM

## 2023-09-26 RX ORDER — SULFAMETHOXAZOLE AND TRIMETHOPRIM 800; 160 MG/1; MG/1
1 TABLET ORAL 2 TIMES DAILY
Qty: 20 TABLET | Refills: 0 | Status: SHIPPED | OUTPATIENT
Start: 2023-09-26 | End: 2023-10-06

## 2023-09-26 RX ADMIN — CEFTRIAXONE SODIUM 2000 MG: 2 INJECTION, POWDER, FOR SOLUTION INTRAMUSCULAR; INTRAVENOUS at 06:04

## 2023-09-26 RX ADMIN — TAMSULOSIN HYDROCHLORIDE 0.4 MG: 0.4 CAPSULE ORAL at 08:48

## 2023-09-26 RX ADMIN — SULFAMETHOXAZOLE AND TRIMETHOPRIM 1 TABLET: 800; 160 TABLET ORAL at 08:48

## 2023-09-26 RX ADMIN — SODIUM CHLORIDE, PRESERVATIVE FREE 10 ML: 5 INJECTION INTRAVENOUS at 08:48

## 2023-09-26 NOTE — PLAN OF CARE
Problem: Discharge Planning  Goal: Discharge to home or other facility with appropriate resources  9/25/2023 2221 by Sol Mejia RN  Outcome: Progressing     Problem: Pain  Goal: Verbalizes/displays adequate comfort level or baseline comfort level  9/25/2023 2221 by Sol Mejia RN  Outcome: Progressing     Problem: Skin/Tissue Integrity - Adult  Goal: Skin integrity remains intact  9/25/2023 2221 by Sol Mejia RN  Outcome: Progressing     Problem: Genitourinary - Adult  Goal: Absence of urinary retention  9/25/2023 2221 by Sol Mejia RN  Outcome: Progressing     Problem: Infection - Adult  Goal: Absence of infection at discharge  9/25/2023 2221 by Sol Mejia RN  Outcome: Progressing     Problem: Metabolic/Fluid and Electrolytes - Adult  Goal: Electrolytes maintained within normal limits  9/25/2023 2221 by Sol Mejia RN  Outcome: Progressing

## 2023-09-26 NOTE — CARE COORDINATION
9/26/2023    To whom it may concern:    Yesenia Laguna  was hospitalized under my care from 9/21/2023 to 9/26/2023. Patient is cleared to return to work on 10/2/2023  Without restrictions. If you have any questions or concerns, please do not hesitate to contact me at [phone].     Sincerely,          Sahara Henao MD      Electronically signed by Sahara Henao MD on 9/26/2023 at 2:04 PM
CASE MANAGEMENT DISCHARGE SUMMARY:    DISCHARGE DATE: 09/26/223    DISCHARGED TO: Home with family support     TRANSPORTATION: Family to transport             TIME: TBD by pt and bedside RN       PREFERRED PHARMACY:     [id number] [phone] - [phone]      COMMENTS: pt to dc home with family support. Mom at bedside and will transport. Pt and bedside RN aware of dc timeline.      Electronically signed by Dewey Pinedo RN on 9/26/2023 at 3:05 PM  Ph: [phone]  Fax: [phone]
Housing: (P) Yes  Other Identified Issues/Barriers to RETURNING to current housing: None  Potential Assistance needed at discharge: (P) N/A            Potential DME:    Patient expects to discharge to: (P) [address] for transportation at discharge: (P) Shawn Milian: /     Does insurance require precert for SNF: Yes    Potential assistance Purchasing Medications: (P) No  Meds-to-Beds request:        [facility] [id number]  [address] [address]  Phone: [phone] Fax: [phone]      Notes:    Factors facilitating achievement of predicted outcomes: Family support, Friend support, Motivated, Cooperative, Pleasant, Sense of humor, and Good insight into deficits    Barriers to discharge: Pain, Medical complications, and No insurance coverage    Additional Case Management Notes: pt is from home alone, states she has [employer] insurance but does not have any of the information handy at this time. Plan is to return home at [place]. Dave Shah will transport. The Plan for Transition of Care is related to the following treatment goals of Ureterolithiasis [N20.1]  Pyelonephritis [N12]  Acute pyelonephritis [N10]      The Patient and/or Patient Representative Agree with the Discharge Plan?  Yes        09/22/23 1019   Service Assessment   Patient Orientation Alert and Oriented;Person;Place;Situation;Self   Cognition Alert   History Provided By Patient   Primary Caregiver Self   Accompanied By/Relationship father Bruce Shah at [address] [facility] Family Members;Parent   Patient's [address] is: Legal Next of [address] [place] [address]   PCP Verified by CM Yes   Last Visit to PCP Within last year   Prior Functional Level Independent in ADLs/IADLs   Current Functional Level Independent in ADLs/IADLs   Can patient return to prior living arrangement Yes   Ability to make needs known: Good   Family able to assist with home care needs: No

## 2023-09-26 NOTE — DISCHARGE INSTR - DIET
Good nutrition is important when healing from an illness, injury, or surgery. Follow any nutrition recommendations given to you during your hospital stay. If you were given an oral nutrition supplement while in the hospital, continue to take this supplement at home. You can take it with meals, in-between meals, and/or before bedtime. These supplements can be purchased at most local grocery stores, pharmacies, and chain [employer]-stores. If you have any questions about your diet or nutrition, call the hospital and ask for the dietitian.     Regular diet as tolerated

## 2023-09-26 NOTE — DISCHARGE INSTR - COC
oxygen:56259}  Ventilator:    {MH CC Vent ZLTW:319145048}    Rehab Therapies: {THERAPEUTIC INTERVENTION:5058652937}  Weight Bearing Status/Restrictions: [address] CC Weight Bearin}  Other Medical Equipment (for information only, NOT a DME order):  {EQUIPMENT:805626303}  Other Treatments: ***    Patient's personal belongings (please select all that are sent with patient):  {P DME Belongings:138457133}    RN SIGNATURE:  {Esignature:749243292}    CASE MANAGEMENT/SOCIAL WORK SECTION    Inpatient Status Date: 2023    Readmission Risk Assessment Score:  Readmission Risk              Risk of Unplanned Readmission:  7             / signature: Electronically signed by Irwin Maguire RN on 23 at 3:04 PM EDT    PHYSICIAN SECTION    Prognosis: {Prognosis:8199232481}    Condition at Discharge: [address] Patient Condition:233146671}    Rehab Potential (if transferring to Rehab): {Prognosis:1049681821}    Recommended Labs or Other Treatments After Discharge: ***    Physician Certification: I certify the above information and transfer of Trey Garner  is necessary for the continuing treatment of the diagnosis listed and that she requires {Admit to Appropriate Level of Care:08164} for {GREATER/LESS:220898898} 30 days.      Update Admission H&P: {CHP DME Changes in RJS:160986724}    PHYSICIAN SIGNATURE:  {Esignature:600204783}

## 2023-09-26 NOTE — PLAN OF CARE
Problem: Discharge Planning  Goal: Discharge to home or other facility with appropriate resources  9/26/2023 1357 by Yuniel Adams RN  Outcome: Completed  9/26/2023 0937 by Yuniel Adams RN  Outcome: Progressing     Problem: Pain  Goal: Verbalizes/displays adequate comfort level or baseline comfort level  9/26/2023 1357 by Yuniel Adams RN  Outcome: Completed  9/26/2023 0937 by Yuniel Adams RN  Outcome: Progressing     Problem: Skin/Tissue Integrity - Adult  Goal: Skin integrity remains intact  9/26/2023 1357 by Yuniel Adams RN  Outcome: Completed  9/26/2023 0937 by Yuniel Adams RN  Outcome: Adequate for Discharge     Problem: Genitourinary - Adult  Goal: Absence of urinary retention  9/26/2023 1357 by Yuniel Adams RN  Outcome: Completed  9/26/2023 0937 by Yuniel Adams RN  Outcome: Adequate for Discharge     Problem: Infection - Adult  Goal: Absence of infection at discharge  9/26/2023 1357 by Yuniel Adams RN  Outcome: Completed  9/26/2023 0937 by Yuniel Adams RN  Outcome: Progressing     Problem: Metabolic/Fluid and Electrolytes - Adult  Goal: Electrolytes maintained within normal limits  9/26/2023 1357 by Yuniel Adams RN  Outcome: Completed  9/26/2023 0937 by Yunile Adams RN  Outcome: Adequate for Discharge

## 2023-09-29 LAB
BACTERIA BLD CULT ORG #2: NORMAL
BACTERIA BLD CULT: NORMAL

## 2023-10-08 ENCOUNTER — APPOINTMENT (OUTPATIENT)
Dept: CT IMAGING | Age: 23
End: 2023-10-08

## 2023-10-08 ENCOUNTER — HOSPITAL ENCOUNTER (INPATIENT)
Age: 23
LOS: 2 days | Discharge: HOME | End: 2023-10-10
Attending: STUDENT IN AN ORGANIZED HEALTH CARE EDUCATION/TRAINING PROGRAM | Admitting: INTERNAL MEDICINE

## 2023-10-08 DIAGNOSIS — R10.9 RIGHT FLANK PAIN: ICD-10-CM

## 2023-10-08 DIAGNOSIS — N10 ACUTE PYELONEPHRITIS: ICD-10-CM

## 2023-10-08 DIAGNOSIS — T83.192A: Primary | ICD-10-CM

## 2023-10-08 DIAGNOSIS — N30.01 ACUTE CYSTITIS WITH HEMATURIA: ICD-10-CM

## 2023-10-08 LAB
ALBUMIN SERPL-MCNC: 4.4 G/DL (ref 3.4–5)
ALP SERPL-CCNC: 90 U/L (ref 40–129)
ALT SERPL-CCNC: 22 U/L (ref 10–40)
ANION GAP SERPL CALCULATED.3IONS-SCNC: 11 MMOL/L (ref 3–16)
AST SERPL-CCNC: 16 U/L (ref 15–37)
BACTERIA URNS QL MICRO: ABNORMAL /HPF
BASOPHILS # BLD: 0.1 K/UL (ref 0–0.2)
BASOPHILS NFR BLD: 1 %
BILIRUB DIRECT SERPL-MCNC: <0.2 MG/DL (ref 0–0.3)
BILIRUB INDIRECT SERPL-MCNC: NORMAL MG/DL (ref 0–1)
BILIRUB SERPL-MCNC: 0.3 MG/DL (ref 0–1)
BILIRUB UR QL STRIP.AUTO: ABNORMAL
BUN SERPL-MCNC: 14 MG/DL (ref 7–20)
CALCIUM SERPL-MCNC: 10.8 MG/DL (ref 8.3–10.6)
CHLORIDE SERPL-SCNC: 104 MMOL/L (ref 99–110)
CLARITY UR: ABNORMAL
CO2 SERPL-SCNC: 21 MMOL/L (ref 21–32)
COLOR UR: ABNORMAL
CREAT SERPL-MCNC: 0.8 MG/DL (ref 0.6–1.1)
DEPRECATED RDW RBC AUTO: 13.1 % (ref 12.4–15.4)
EOSINOPHIL # BLD: 0.3 K/UL (ref 0–0.6)
EOSINOPHIL NFR BLD: 2.2 %
EPI CELLS #/AREA URNS HPF: ABNORMAL /HPF (ref 0–5)
GFR SERPLBLD CREATININE-BSD FMLA CKD-EPI: >60 ML/MIN/{1.73_M2}
GLUCOSE SERPL-MCNC: 108 MG/DL (ref 70–99)
GLUCOSE UR STRIP.AUTO-MCNC: NEGATIVE MG/DL
HCG SERPL QL: NEGATIVE
HCT VFR BLD AUTO: 37 % (ref 36–48)
HGB BLD-MCNC: 12.4 G/DL (ref 12–16)
HGB UR QL STRIP.AUTO: ABNORMAL
KETONES UR STRIP.AUTO-MCNC: NEGATIVE MG/DL
LEUKOCYTE ESTERASE UR QL STRIP.AUTO: ABNORMAL
LIPASE SERPL-CCNC: 24 U/L (ref 13–60)
LYMPHOCYTES # BLD: 2.1 K/UL (ref 1–5.1)
LYMPHOCYTES NFR BLD: 16.9 %
MCH RBC QN AUTO: 30.5 PG (ref 26–34)
MCHC RBC AUTO-ENTMCNC: 33.6 G/DL (ref 31–36)
MCV RBC AUTO: 90.6 FL (ref 80–100)
MONOCYTES # BLD: 0.8 K/UL (ref 0–1.3)
MONOCYTES NFR BLD: 6.5 %
NEUTROPHILS # BLD: 9.3 K/UL (ref 1.7–7.7)
NEUTROPHILS NFR BLD: 73.4 %
NITRITE UR QL STRIP.AUTO: POSITIVE
PH UR STRIP.AUTO: 6.5 [PH] (ref 5–8)
PLATELET # BLD AUTO: 414 K/UL (ref 135–450)
PMV BLD AUTO: 8 FL (ref 5–10.5)
POTASSIUM SERPL-SCNC: 4.5 MMOL/L (ref 3.5–5.1)
PROT SERPL-MCNC: 6.9 G/DL (ref 6.4–8.2)
PROT UR STRIP.AUTO-MCNC: 100 MG/DL
RBC # BLD AUTO: 4.08 M/UL (ref 4–5.2)
RBC #/AREA URNS HPF: >100 /HPF (ref 0–4)
SODIUM SERPL-SCNC: 136 MMOL/L (ref 136–145)
SP GR UR STRIP.AUTO: 1.02 (ref 1–1.03)
TRICHOMONAS #/AREA URNS HPF: ABNORMAL /HPF
UA COMPLETE W REFLEX CULTURE PNL UR: YES
UA DIPSTICK W REFLEX MICRO PNL UR: YES
URN SPEC COLLECT METH UR: ABNORMAL
UROBILINOGEN UR STRIP-ACNC: 1 E.U./DL
WBC # BLD AUTO: 12.6 K/UL (ref 4–11)
WBC #/AREA URNS HPF: >100 /HPF (ref 0–5)

## 2023-10-08 PROCEDURE — 85025 COMPLETE CBC W/AUTO DIFF WBC: CPT

## 2023-10-08 PROCEDURE — 74177 CT ABD & PELVIS W/CONTRAST: CPT

## 2023-10-08 PROCEDURE — 96375 TX/PRO/DX INJ NEW DRUG ADDON: CPT

## 2023-10-08 PROCEDURE — 6360000002 HC RX W HCPCS: Performed by: INTERNAL MEDICINE

## 2023-10-08 PROCEDURE — 84703 CHORIONIC GONADOTROPIN ASSAY: CPT

## 2023-10-08 PROCEDURE — 6360000004 HC RX CONTRAST MEDICATION: Performed by: STUDENT IN AN ORGANIZED HEALTH CARE EDUCATION/TRAINING PROGRAM

## 2023-10-08 PROCEDURE — 1200000000 HC SEMI PRIVATE

## 2023-10-08 PROCEDURE — 6360000002 HC RX W HCPCS: Performed by: STUDENT IN AN ORGANIZED HEALTH CARE EDUCATION/TRAINING PROGRAM

## 2023-10-08 PROCEDURE — 36415 COLL VENOUS BLD VENIPUNCTURE: CPT

## 2023-10-08 PROCEDURE — 99285 EMERGENCY DEPT VISIT HI MDM: CPT

## 2023-10-08 PROCEDURE — 80048 BASIC METABOLIC PNL TOTAL CA: CPT

## 2023-10-08 PROCEDURE — 2580000003 HC RX 258: Performed by: STUDENT IN AN ORGANIZED HEALTH CARE EDUCATION/TRAINING PROGRAM

## 2023-10-08 PROCEDURE — 2580000003 HC RX 258: Performed by: INTERNAL MEDICINE

## 2023-10-08 PROCEDURE — 96365 THER/PROPH/DIAG IV INF INIT: CPT

## 2023-10-08 PROCEDURE — 80076 HEPATIC FUNCTION PANEL: CPT

## 2023-10-08 PROCEDURE — 81001 URINALYSIS AUTO W/SCOPE: CPT

## 2023-10-08 PROCEDURE — 6370000000 HC RX 637 (ALT 250 FOR IP): Performed by: INTERNAL MEDICINE

## 2023-10-08 PROCEDURE — 83690 ASSAY OF LIPASE: CPT

## 2023-10-08 RX ORDER — KETOROLAC TROMETHAMINE 15 MG/ML
15 INJECTION, SOLUTION INTRAMUSCULAR; INTRAVENOUS EVERY 6 HOURS PRN
Status: DISCONTINUED | OUTPATIENT
Start: 2023-10-08 | End: 2023-10-10 | Stop reason: HOSPADM

## 2023-10-08 RX ORDER — ONDANSETRON 2 MG/ML
4 INJECTION INTRAMUSCULAR; INTRAVENOUS EVERY 6 HOURS PRN
Status: DISCONTINUED | OUTPATIENT
Start: 2023-10-08 | End: 2023-10-10 | Stop reason: HOSPADM

## 2023-10-08 RX ORDER — ONDANSETRON 2 MG/ML
4 INJECTION INTRAMUSCULAR; INTRAVENOUS ONCE
Status: COMPLETED | OUTPATIENT
Start: 2023-10-08 | End: 2023-10-08

## 2023-10-08 RX ORDER — ACETAMINOPHEN 650 MG/1
650 SUPPOSITORY RECTAL EVERY 6 HOURS PRN
Status: DISCONTINUED | OUTPATIENT
Start: 2023-10-08 | End: 2023-10-10 | Stop reason: HOSPADM

## 2023-10-08 RX ORDER — POTASSIUM CHLORIDE 7.45 MG/ML
10 INJECTION INTRAVENOUS PRN
Status: DISCONTINUED | OUTPATIENT
Start: 2023-10-08 | End: 2023-10-10 | Stop reason: HOSPADM

## 2023-10-08 RX ORDER — ACETAMINOPHEN 325 MG/1
650 TABLET ORAL EVERY 6 HOURS PRN
Status: DISCONTINUED | OUTPATIENT
Start: 2023-10-08 | End: 2023-10-10 | Stop reason: HOSPADM

## 2023-10-08 RX ORDER — POLYETHYLENE GLYCOL 3350 17 G/17G
17 POWDER, FOR SOLUTION ORAL DAILY PRN
Status: DISCONTINUED | OUTPATIENT
Start: 2023-10-08 | End: 2023-10-10 | Stop reason: HOSPADM

## 2023-10-08 RX ORDER — POTASSIUM CHLORIDE 20 MEQ/1
40 TABLET, EXTENDED RELEASE ORAL PRN
Status: DISCONTINUED | OUTPATIENT
Start: 2023-10-08 | End: 2023-10-10 | Stop reason: HOSPADM

## 2023-10-08 RX ORDER — ONDANSETRON 4 MG/1
4 TABLET, ORALLY DISINTEGRATING ORAL EVERY 8 HOURS PRN
Status: DISCONTINUED | OUTPATIENT
Start: 2023-10-08 | End: 2023-10-10 | Stop reason: HOSPADM

## 2023-10-08 RX ORDER — SODIUM CHLORIDE 9 MG/ML
INJECTION, SOLUTION INTRAVENOUS CONTINUOUS
Status: ACTIVE | OUTPATIENT
Start: 2023-10-08 | End: 2023-10-10

## 2023-10-08 RX ORDER — KETOROLAC TROMETHAMINE 15 MG/ML
15 INJECTION, SOLUTION INTRAMUSCULAR; INTRAVENOUS ONCE
Status: COMPLETED | OUTPATIENT
Start: 2023-10-08 | End: 2023-10-08

## 2023-10-08 RX ORDER — MORPHINE SULFATE 2 MG/ML
2 INJECTION, SOLUTION INTRAMUSCULAR; INTRAVENOUS EVERY 4 HOURS PRN
Status: DISCONTINUED | OUTPATIENT
Start: 2023-10-08 | End: 2023-10-10 | Stop reason: HOSPADM

## 2023-10-08 RX ORDER — 0.9 % SODIUM CHLORIDE 0.9 %
1000 INTRAVENOUS SOLUTION INTRAVENOUS ONCE
Status: COMPLETED | OUTPATIENT
Start: 2023-10-08 | End: 2023-10-08

## 2023-10-08 RX ORDER — MAGNESIUM SULFATE IN WATER 40 MG/ML
2000 INJECTION, SOLUTION INTRAVENOUS PRN
Status: DISCONTINUED | OUTPATIENT
Start: 2023-10-08 | End: 2023-10-10 | Stop reason: HOSPADM

## 2023-10-08 RX ORDER — SODIUM CHLORIDE 0.9 % (FLUSH) 0.9 %
10 SYRINGE (ML) INJECTION PRN
Status: DISCONTINUED | OUTPATIENT
Start: 2023-10-08 | End: 2023-10-10 | Stop reason: HOSPADM

## 2023-10-08 RX ORDER — SODIUM CHLORIDE 0.9 % (FLUSH) 0.9 %
5-40 SYRINGE (ML) INJECTION EVERY 12 HOURS SCHEDULED
Status: DISCONTINUED | OUTPATIENT
Start: 2023-10-08 | End: 2023-10-10 | Stop reason: HOSPADM

## 2023-10-08 RX ORDER — SODIUM CHLORIDE 9 MG/ML
INJECTION, SOLUTION INTRAVENOUS PRN
Status: DISCONTINUED | OUTPATIENT
Start: 2023-10-08 | End: 2023-10-10 | Stop reason: HOSPADM

## 2023-10-08 RX ORDER — ENOXAPARIN SODIUM 100 MG/ML
40 INJECTION SUBCUTANEOUS
Status: DISCONTINUED | OUTPATIENT
Start: 2023-10-08 | End: 2023-10-10 | Stop reason: HOSPADM

## 2023-10-08 RX ADMIN — KETOROLAC TROMETHAMINE 15 MG: 15 INJECTION, SOLUTION INTRAMUSCULAR; INTRAVENOUS at 18:47

## 2023-10-08 RX ADMIN — SODIUM CHLORIDE 1000 ML: 9 INJECTION, SOLUTION INTRAVENOUS at 08:12

## 2023-10-08 RX ADMIN — SODIUM CHLORIDE: 9 INJECTION, SOLUTION INTRAVENOUS at 12:18

## 2023-10-08 RX ADMIN — IOMEPROL INJECTION 100 ML: 714 INJECTION, SOLUTION INTRAVASCULAR at 09:06

## 2023-10-08 RX ADMIN — Medication 10 ML: at 20:16

## 2023-10-08 RX ADMIN — KETOROLAC TROMETHAMINE 15 MG: 15 INJECTION, SOLUTION INTRAMUSCULAR; INTRAVENOUS at 08:13

## 2023-10-08 RX ADMIN — ONDANSETRON 4 MG: 2 INJECTION INTRAMUSCULAR; INTRAVENOUS at 08:12

## 2023-10-08 RX ADMIN — ENOXAPARIN SODIUM 40 MG: 100 INJECTION SUBCUTANEOUS at 20:16

## 2023-10-08 RX ADMIN — ONDANSETRON 4 MG: 4 TABLET, ORALLY DISINTEGRATING ORAL at 18:47

## 2023-10-08 RX ADMIN — SODIUM CHLORIDE: 9 INJECTION, SOLUTION INTRAVENOUS at 20:20

## 2023-10-08 RX ADMIN — CEFTRIAXONE 2000 MG: 1 INJECTION, POWDER, FOR SOLUTION INTRAMUSCULAR; INTRAVENOUS at 08:37

## 2023-10-08 ASSESSMENT — PAIN SCALES - GENERAL
PAINLEVEL_OUTOF10: 5
PAINLEVEL_OUTOF10: 2
PAINLEVEL_OUTOF10: 3
PAINLEVEL_OUTOF10: 2
PAINLEVEL_OUTOF10: 6
PAINLEVEL_OUTOF10: 6

## 2023-10-08 ASSESSMENT — PATIENT HEALTH QUESTIONNAIRE - PHQ9
2. FEELING DOWN, DEPRESSED OR HOPELESS: 0
SUM OF ALL RESPONSES TO PHQ QUESTIONS 1-9: 0
1. LITTLE INTEREST OR PLEASURE IN DOING THINGS: 0
SUM OF ALL RESPONSES TO PHQ9 QUESTIONS 1 & 2: 0

## 2023-10-08 ASSESSMENT — LIFESTYLE VARIABLES
HOW MANY STANDARD DRINKS CONTAINING ALCOHOL DO YOU HAVE ON A TYPICAL DAY: PATIENT DOES NOT DRINK
HOW OFTEN DO YOU HAVE A DRINK CONTAINING ALCOHOL: NEVER

## 2023-10-08 ASSESSMENT — PAIN DESCRIPTION - PAIN TYPE: TYPE: ACUTE PAIN

## 2023-10-08 ASSESSMENT — PAIN DESCRIPTION - ORIENTATION
ORIENTATION: RIGHT
ORIENTATION: RIGHT

## 2023-10-08 ASSESSMENT — PAIN DESCRIPTION - DESCRIPTORS: DESCRIPTORS: ACHING

## 2023-10-08 ASSESSMENT — PAIN DESCRIPTION - ONSET: ONSET: ON-GOING

## 2023-10-08 ASSESSMENT — PAIN DESCRIPTION - FREQUENCY: FREQUENCY: CONTINUOUS

## 2023-10-08 ASSESSMENT — PAIN DESCRIPTION - LOCATION
LOCATION: FLANK
LOCATION: ABDOMEN;FLANK

## 2023-10-08 ASSESSMENT — PAIN - FUNCTIONAL ASSESSMENT: PAIN_FUNCTIONAL_ASSESSMENT: ACTIVITIES ARE NOT PREVENTED

## 2023-10-08 NOTE — H&P
IV CONTRAST Additional Contrast? None    Result Date: 10/8/2023  EXAMINATION: CT OF THE ABDOMEN AND PELVIS WITH CONTRAST 10/8/2023 9:06 am TECHNIQUE: CT of the abdomen and pelvis was performed with the administration of intravenous contrast. Multiplanar reformatted images are provided for review. Automated exposure control, iterative reconstruction, and/or weight based adjustment of the mA/kV was utilized to reduce the radiation dose to as low as reasonably achievable. COMPARISON: 09/21/2013. HISTORY: ORDERING SYSTEM PROVIDED HISTORY: flank pain hx of ureteral stent and kidney stones as well as recent pyelo TECHNOLOGIST PROVIDED HISTORY: Additional Contrast?->None Reason for exam:->flank pain hx of ureteral stent and kidney stones as well as recent pyelo Decision Support Exception - unselect if not a suspected or confirmed emergency medical condition->Emergency Medical Condition (MA) Reason for Exam: right sided flank pain, recent stent Relevant Medical/Surgical History: h/o stones FINDINGS: Lower Chest: No acute infiltrate at the lung bases. Organs: The liver, spleen, pancreas and adrenal glands are unremarkable. No acute biliary findings. Interval placement of a right ureteral stent with minimal residual hydronephrosis. Right nephrolithiasis persists. There is a persistent proximal right ureteral calculus. There is stable heterogeneity of the renal parenchyma with no well-defined lesion. The left kidney is unremarkable with redemonstration of left nephrolithiasis. GI/Bowel: The stomach and duodenal sweep are unremarkable. No small bowel distension. No evidence of appendicitis. Mild retained stool throughout the colon. No pericolonic inflammatory changes. Pelvis: Trace free pelvic fluid, likely related to a ruptured follicular cyst on the right. The uterus and adnexal structures are otherwise unremarkable.  Mild bladder wall thick scarred at partial distention of the urinary bladder with mild diffuse bladder wall thickening, similar to the previous study. Peritoneum/Retroperitoneum: The abdominal aorta is normal in caliber. No pathologic retroperitoneal adenopathy or upper abdominal ascites. Bones/Soft Tissues: No acute osseous or soft tissue abnormality. 1. Interval placement of right renal stent with minimal residual hydronephrosis. Bilateral nephrolithiasis persists. 2. Mild bladder wall thickening and heterogeneity of the right renal parenchyma, likely related to the patient's history of UTI. Correlate with urinalysis. 3. No acute bowel pathology. 4. Trace free pelvic fluid, likely related to a ruptured follicular cyst on the right. No routine imaging follow-up indicated.          Discussed with HUONG SU.      Thank you MACO Gonzalez - AN for the opportunity to be involved in this patient's care.    -----------------------------  Efrain Bland MD  [facility]

## 2023-10-08 NOTE — CONSULTS
Reason for Consult: pyelonephritis s/p stent    History of Present Illness: Law Miranda is a 20 y.o. with PMH of nephrolithiasis s/p emergent stent placement for obstructing right ureteral stone with UTI 9/22/23. Urine/blood cultures were positive and patient treated with course of abx. She represents tonight with fever, chills, dysuria with right flank pain. CTAP shows right stent in correct location with improved right hydronephrosis and bladder wall thickening c/w cystitis. WBC 12K, creatinine 0.8 (baseline), UA +nitrite/LE. Past Medical History:   Past Medical History:   Diagnosis Date    ESBL (extended spectrum beta-lactamase) producing bacteria infection 01/05/2020    urine    Kidney stone        Past Surgical History:  Past Surgical History:   Procedure Laterality Date    CYSTOSCOPY Right 9/22/2023    CYSTOSCOPY RIGHT URETERAL STENT INSERTION performed by Estella Aguiar MD at [facility] History:  Social History     Socioeconomic History    Marital status: Single     Spouse name: Not on file    Number of children: Not on file    Years of education: Not on file    Highest education level: Not on file   Occupational History    Not on file   Tobacco Use    Smoking status: Never    Smokeless tobacco: Never   Vaping Use    Vaping Use: Never used   Substance and Sexual Activity    Alcohol use: Yes     Comment: socially    Drug use: Not Currently    Sexual activity: Yes   Other Topics Concern    Not on file   Social History Narrative    Not on file     Social Determinants of Health     Financial Resource Strain: Not on file   Food Insecurity: Not on file   Transportation Needs: Not on file   Physical Activity: Not on file   Stress: Not on file   Social Connections: Not on file   Intimate Partner Violence: Not on file   Housing Stability: Not on file       Family History:  History reviewed. No pertinent family history.     Meds:   Current Facility-Administered Medications: [START ON lower extremity edema noted  Abdomen: Soft, non-tender, non-distended, no masses  Skin: Skin color, texture, turgor normal, no rashes or lesions  Neurologic: no gross deficits  Female :   No CVA tenderness    Labs:  CBC   Lab Results   Component Value Date/Time    WBC 12.6 10/08/2023 08:10 AM    RBC 4.08 10/08/2023 08:10 AM    HGB 12.4 10/08/2023 08:10 AM    HCT 37.0 10/08/2023 08:10 AM    MCV 90.6 10/08/2023 08:10 AM    MCH 30.5 10/08/2023 08:10 AM    MCHC 33.6 10/08/2023 08:10 AM    RDW 13.1 10/08/2023 08:10 AM     10/08/2023 08:10 AM    MPV 8.0 10/08/2023 08:10 AM     BMP   Lab Results   Component Value Date/Time     10/08/2023 08:10 AM    K 4.5 10/08/2023 08:10 AM     10/08/2023 08:10 AM    CO2 21 10/08/2023 08:10 AM    BUN 14 10/08/2023 08:10 AM    CREATININE 0.8 10/08/2023 08:10 AM    GLUCOSE 108 10/08/2023 08:10 AM    CALCIUM 10.8 10/08/2023 08:10 AM       Urinalysis:   Lab Results   Component Value Date/Time    COLORU BROWN 10/08/2023 08:00 AM    GLUCOSEU Negative 10/08/2023 08:00 AM    BLOODU LARGE 10/08/2023 08:00 AM    NITRU POSITIVE 10/08/2023 08:00 AM    LEUKOCYTESUR LARGE 10/08/2023 08:00 AM       Imaging: Pertinent images and radiologist's report were reviewed independently    Impression/Plan: Pyelonephritis   - Follow up urine/blood cultures, continue broad spectrum abx  - Keep outpatient follow up for definitve stone treatment    Rosalino Toussaint MD 10/8/59506:48 PM

## 2023-10-08 NOTE — ED NOTES
Pt transferred to [address] [place] [address] at this time via [facility]. Report given to transport, all questions answered. Pt documents and belongings sent with transport to give to receiving nurse Ramon Ma. Pt alert and oriented x4, with no s/s or c/o pain or distress noted or reported at time of transfer. Report given to receiving BK Ma, all questions answered.        Samir Borges RN  10/08/23 2230

## 2023-10-08 NOTE — ED NOTES
Pt to ED with c/o nausea, fatigue and right side flank pain that radiates to mid abd. States symptoms started yesterday, progressively worsening. Recent stent placement.       Sami Moody RN  10/08/23 8233

## 2023-10-08 NOTE — ED NOTES
Pt to be admitted to [facility], rm 5214. Transfer center to call when transportation has been set up. Pt and pt's mother updated on plan of care and are in agreement with admission. All questions answered, denies any further questions at this time.      Bhupendra Day RN  10/08/23 2283

## 2023-10-08 NOTE — PROGRESS NOTES
4 Eyes Skin Assessment     NAME:  Loli Parr  YOB: 2000  MEDICAL RECORD NUMBER:  [id number]    The patient is being assessed for  Admission    I agree that at least one RN has performed a thorough Head to Toe Skin Assessment on the patient. ALL assessment sites listed below have been assessed. Areas assessed by both nurses:    Head, Face, Ears, Shoulders, Back, Chest, Arms, Elbows, Hands, Sacrum. Buttock, Coccyx, Ischium, and Legs. Feet and Heels        Does the Patient have a Wound?  No noted wound(s)       Ramesh Prevention initiated by RN: No  Wound Care Orders initiated by RN: No    Pressure Injury (Stage 3,4, Unstageable, DTI, NWPT, and Complex wounds) if present, place Wound referral order by RN under : No    New Ostomies, if present place, Ostomy referral order under : No     Nurse 1 eSignature: Electronically signed by Tania Molina RN on 10/8/23 at 12:27 PM EDT    **SHARE this note so that the co-signing nurse can place an eSignature**    Nurse 2 eSignature: Electronically signed by Adia Urias RN on 10/9/23 at 6:23 AM EDT

## 2023-10-08 NOTE — ED NOTES
Pt resting in bed comfortably with no s/s or c/o pain or distress noted or reported. Pt verbalizes relief of pain after receiving ordered medications. Pt reports that her pain has decreased, currently rates her pain a 2/10 and states her pain is tolerable at this time. Mother remains at bedside. Call light in reach, will continue to monitor.       Ez Mina RN  10/08/23 1143

## 2023-10-08 NOTE — ED NOTES
Access center called.    Hospitalist calling back and speaking to Piedad Shaper, Harl Fothergill, RN  10/08/23 0024

## 2023-10-08 NOTE — ED NOTES
BK Holbrook with access center updated in regards to pt bed assignment and made aware that transportation was set up with [employer], eta 30-45 min.      Tuyet Gonzalez RN  10/08/23 8702

## 2023-10-08 NOTE — ED PROVIDER NOTES
the following components:    WBC, UA >100 (*)     RBC, UA >100 (*)     Epithelial Cells, UA 6-10 (*)     Bacteria, UA 2+ (*)     All other components within normal limits   CULTURE, URINE   HEPATIC FUNCTION PANEL   LIPASE   HCG, SERUM, QUALITATIVE       (Any cultures that may have been sent were not resulted at the time of this patient visit)    [facility] / ED COURSE:     External Documentation Reviewed:         Previous patient encounter documents & history available on EMR was reviewed: Patient was seen on 9/21/2023 for urolithiasis and acute pyelonephritis    1)           Differential Diagnosis includes (but not limited to):  Pyelonephritis, nephrolithiasis, stent malfunction, pyelonephritis abscess        Diagnoses Considered but I have low suspicion of:   AAA, ectopic pregnancy, pregnancy       Decision Rules/Clinical Scores utilized:                 See Formal Diagnostic Results above for the lab and radiology tests and orders.     3)  Treatment and Disposition         ED Reassessment:  See ED course         Consults: Dr. Harper with urology advised that patient needs symptoms can be managed with antibiotics as I am does not see any surgical intervention with the stent necessary, hospitalist regarding need for admission and urological consultation already discussed         Shared Decision-Making was performed and disposition discussed with the        Patient/Family and questions answered          Social determinants of health impacting treatment or disposition:  none      Summary of Patient Presentation:      ED Course as of 10/08/23 1000   Sun Oct 08, 2023   0807 Blood, Urine(!): LARGE [AL]   0807 Nitrite, Urine(!): POSITIVE [AL]   0807 Leukocyte Esterase, Urine(!): LARGE [AL]   3746 WBC, UA(!): >100 [AL]   0815 Bacteria, UA(!): 2+ [AL]   0822 WBC(!): 12.6 [AL]   0822 Hemoglobin Quant: 12.4 [AL]   0822 Platelet Count: 617 [AL]   0822 Platelet Count: 440 [AL]   5625 hCG Qual: Negative [AL] 0857 ALT: 22 [AL]   0857 AST: 16 [AL]   0857 Sodium: 136 [AL]   0857 Potassium: 4.5 [AL]   6741 Creatinine: 0.8 [AL]   0857 Est, [place] Filt Rate: >60 [AL]   0932 CT ABDOMEN PELVIS W IV CONTRAST Additional Contrast? None  \"IMPRESSION:  1. Interval placement of right renal stent with minimal residual  hydronephrosis. Bilateral nephrolithiasis persists. 2. Mild bladder wall thickening and heterogeneity of the right renal  parenchyma, likely related to the patient's history of UTI. Correlate with  urinalysis. 3. No acute bowel pathology. 4. Trace free pelvic fluid, likely related to a ruptured follicular cyst on  the right. No routine imaging follow-up indicated. \" [AL]   1020 [facility] placed along with urology consultation   [AL]   [phone] to dr Ritu Davey who will consult on the patient, advise no need to operate on the stent at this time. [AL]   7923 Dr Manuel Bond has accepted the pt for admission  [AL]      ED Course User Index  [AL] Druscilla Jeans, MD       Is this patient to be included in the SEP-1 core measure? No Exclusion criteria - the patient is NOT to be included for SEP-1 Core Measure due to: 2+ SIRS criteria are not met     Medical Decision Making  Amount and/or Complexity of Data Reviewed  Labs: ordered. Decision-making details documented in ED Course. Radiology: ordered. Decision-making details documented in ED Course. Risk  Prescription drug management. Decision regarding hospitalization. This is an ill-appearing 18-year-old female who is coming with acute right flank pain over the last few days with hematuria and cystitis like symptomatology. She has signs of cystitis on imaging as well as residual hydronephrosis, urinary nauseous also signs of infection patient is to complete a course of Bactrim may have had some resistance.   She will be admitted to the hospitalist with consultation by urology who does not see any need for surgical invention on the stent placed at this

## 2023-10-08 NOTE — PROGRESS NOTES
EDSBAR report has been reviewed.       Electronically signed by Veronika Fleming RN on 10/8/2023 at 10:38 AM

## 2023-10-08 NOTE — ED NOTES
[facility] ambulance called and has ambulance availability right now. [facility] [address] transport [address] in 30-45 minutes. Will call access center and inform of bed assignment and ambulance arrangements.      Buffy Vaca RN  10/08/23 2066

## 2023-10-08 NOTE — PROGRESS NOTES
Pt admitted to room 5262. Pt oriented to room with call light in reach. Pt denies any pain at this time. VSS. Plan of care reviewed with pt. Pt's mother at bedside. Pt denies any further needs at this time.

## 2023-10-09 LAB
ALBUMIN SERPL-MCNC: 3.6 G/DL (ref 3.4–5)
ALBUMIN/GLOB SERPL: 1.4 {RATIO} (ref 1.1–2.2)
ALP SERPL-CCNC: 71 U/L (ref 40–129)
ALT SERPL-CCNC: 18 U/L (ref 10–40)
ANION GAP SERPL CALCULATED.3IONS-SCNC: 9 MMOL/L (ref 3–16)
AST SERPL-CCNC: 15 U/L (ref 15–37)
BASOPHILS # BLD: 0.1 K/UL (ref 0–0.2)
BASOPHILS NFR BLD: 1.1 %
BILIRUB SERPL-MCNC: <0.2 MG/DL (ref 0–1)
BUN SERPL-MCNC: 7 MG/DL (ref 7–20)
CALCIUM SERPL-MCNC: 9.4 MG/DL (ref 8.3–10.6)
CHLORIDE SERPL-SCNC: 105 MMOL/L (ref 99–110)
CO2 SERPL-SCNC: 23 MMOL/L (ref 21–32)
CREAT SERPL-MCNC: 0.6 MG/DL (ref 0.6–1.1)
DEPRECATED RDW RBC AUTO: 13.4 % (ref 12.4–15.4)
EOSINOPHIL # BLD: 0.3 K/UL (ref 0–0.6)
EOSINOPHIL NFR BLD: 4.5 %
GFR SERPLBLD CREATININE-BSD FMLA CKD-EPI: >60 ML/MIN/{1.73_M2}
GLUCOSE SERPL-MCNC: 87 MG/DL (ref 70–99)
HCT VFR BLD AUTO: 35.4 % (ref 36–48)
HGB BLD-MCNC: 11.4 G/DL (ref 12–16)
LYMPHOCYTES # BLD: 2.7 K/UL (ref 1–5.1)
LYMPHOCYTES NFR BLD: 36.7 %
MAGNESIUM SERPL-MCNC: 1.8 MG/DL (ref 1.8–2.4)
MCH RBC QN AUTO: 29.9 PG (ref 26–34)
MCHC RBC AUTO-ENTMCNC: 32.4 G/DL (ref 31–36)
MCV RBC AUTO: 92.3 FL (ref 80–100)
MONOCYTES # BLD: 0.5 K/UL (ref 0–1.3)
MONOCYTES NFR BLD: 7.1 %
NEUTROPHILS # BLD: 3.7 K/UL (ref 1.7–7.7)
NEUTROPHILS NFR BLD: 50.6 %
PHOSPHATE SERPL-MCNC: 2.3 MG/DL (ref 2.5–4.9)
PLATELET # BLD AUTO: 397 K/UL (ref 135–450)
PMV BLD AUTO: 8.7 FL (ref 5–10.5)
POTASSIUM SERPL-SCNC: 3.8 MMOL/L (ref 3.5–5.1)
PROT SERPL-MCNC: 6.2 G/DL (ref 6.4–8.2)
RBC # BLD AUTO: 3.83 M/UL (ref 4–5.2)
SODIUM SERPL-SCNC: 137 MMOL/L (ref 136–145)
WBC # BLD AUTO: 7.3 K/UL (ref 4–11)

## 2023-10-09 PROCEDURE — 84100 ASSAY OF PHOSPHORUS: CPT

## 2023-10-09 PROCEDURE — 1200000000 HC SEMI PRIVATE

## 2023-10-09 PROCEDURE — 2580000003 HC RX 258: Performed by: INTERNAL MEDICINE

## 2023-10-09 PROCEDURE — 83735 ASSAY OF MAGNESIUM: CPT

## 2023-10-09 PROCEDURE — 6360000002 HC RX W HCPCS: Performed by: INTERNAL MEDICINE

## 2023-10-09 PROCEDURE — 85025 COMPLETE CBC W/AUTO DIFF WBC: CPT

## 2023-10-09 PROCEDURE — 94760 N-INVAS EAR/PLS OXIMETRY 1: CPT

## 2023-10-09 PROCEDURE — 6370000000 HC RX 637 (ALT 250 FOR IP): Performed by: INTERNAL MEDICINE

## 2023-10-09 PROCEDURE — 36415 COLL VENOUS BLD VENIPUNCTURE: CPT

## 2023-10-09 PROCEDURE — 80053 COMPREHEN METABOLIC PANEL: CPT

## 2023-10-09 RX ORDER — OXYCODONE HYDROCHLORIDE AND ACETAMINOPHEN 5; 325 MG/1; MG/1
1 TABLET ORAL EVERY 8 HOURS PRN
Status: ON HOLD | COMMUNITY
End: 2023-10-10 | Stop reason: SDUPTHER

## 2023-10-09 RX ORDER — OXYBUTYNIN CHLORIDE 5 MG/1
5 TABLET ORAL 3 TIMES DAILY
COMMUNITY

## 2023-10-09 RX ADMIN — KETOROLAC TROMETHAMINE 15 MG: 15 INJECTION, SOLUTION INTRAMUSCULAR; INTRAVENOUS at 21:45

## 2023-10-09 RX ADMIN — ENOXAPARIN SODIUM 40 MG: 100 INJECTION SUBCUTANEOUS at 21:45

## 2023-10-09 RX ADMIN — CEFTRIAXONE SODIUM 2000 MG: 2 INJECTION, POWDER, FOR SOLUTION INTRAMUSCULAR; INTRAVENOUS at 07:51

## 2023-10-09 RX ADMIN — POTASSIUM & SODIUM PHOSPHATES POWDER PACK 280-160-250 MG 500 MG: 280-160-250 PACK at 16:13

## 2023-10-09 RX ADMIN — Medication 10 ML: at 21:45

## 2023-10-09 RX ADMIN — KETOROLAC TROMETHAMINE 15 MG: 15 INJECTION, SOLUTION INTRAMUSCULAR; INTRAVENOUS at 00:13

## 2023-10-09 ASSESSMENT — PAIN - FUNCTIONAL ASSESSMENT
PAIN_FUNCTIONAL_ASSESSMENT: ACTIVITIES ARE NOT PREVENTED
PAIN_FUNCTIONAL_ASSESSMENT: ACTIVITIES ARE NOT PREVENTED

## 2023-10-09 ASSESSMENT — PAIN DESCRIPTION - PAIN TYPE
TYPE: ACUTE PAIN
TYPE: ACUTE PAIN

## 2023-10-09 ASSESSMENT — PAIN DESCRIPTION - LOCATION
LOCATION: ABDOMEN;FLANK
LOCATION: ABDOMEN;FLANK
LOCATION: FLANK

## 2023-10-09 ASSESSMENT — PAIN SCALES - GENERAL
PAINLEVEL_OUTOF10: 7
PAINLEVEL_OUTOF10: 7
PAINLEVEL_OUTOF10: 3
PAINLEVEL_OUTOF10: 7

## 2023-10-09 ASSESSMENT — PAIN DESCRIPTION - ONSET: ONSET: ON-GOING

## 2023-10-09 ASSESSMENT — PAIN DESCRIPTION - ORIENTATION
ORIENTATION: RIGHT

## 2023-10-09 ASSESSMENT — PAIN DESCRIPTION - DESCRIPTORS
DESCRIPTORS: ACHING;CRAMPING
DESCRIPTORS: DISCOMFORT;CRAMPING
DESCRIPTORS: ACHING;SHARP

## 2023-10-09 ASSESSMENT — PAIN DESCRIPTION - FREQUENCY: FREQUENCY: CONTINUOUS

## 2023-10-09 NOTE — CARE COORDINATION
[place] paperwork forwarded to [address] [place] [address] with [facility] [place].      Electronically signed by Luis A Melgar RN Case Management on 10/9/2023 at 1:53 PM

## 2023-10-09 NOTE — CARE COORDINATION
10/09/23 1031   Readmission Assessment   Number of Days since last admission? 8-30 days   Previous Disposition Home Alone   Who is being Interviewed Patient   What was the patient's/caregiver's perception as to why they think they needed to return back to the hospital? Other (Comment)  (blood in urine, felt feverish)   Did you visit your Primary Care Physician after you left the hospital, before you returned this time? No   Why weren't you able to visit your PCP? Did not have an appointment   Did you see a specialist, such as Cardiac, Pulmonary, Orthopedic Physician, etc. after you left the hospital? No  (telephone call to Urologist)   Who advised the patient to return to the hospital? Physician;Physician's Nurse/Office staff  (telephone call to Urology, advised he to come to ED if had chills/fever)   Does the patient report anything that got in the way of taking their medications? No   In our efforts to provide the best possible care to you and others like you, can you think of anything that we could have done to help you after you left the hospital the first time, so that you might not have needed to return so soon?  Other (Comment)  (patient says \"no\")

## 2023-10-09 NOTE — CARE COORDINATION
Case Management Assessment  Initial Evaluation    Date/Time of Evaluation: 10/9/2023 10:30 AM  Assessment Completed by: Erika Ruiz RN    If patient is discharged prior to next notation, then this note serves as note for discharge by case management. Patient Name: Akshat Worrell                   YOB: 2000  Diagnosis: Acute pyelonephritis [N10]  Right flank pain [R10.9]  Acute cystitis with hematuria [N30.01]  Other mechanical complication of indwelling ureteral stent, initial encounter [facility]) Tom Khan                   Date / Time: 10/8/2023  7:36 AM    Patient Admission Status: Inpatient   Readmission Risk (Low < 19, Mod (19-27), High > 27): Readmission Risk Score: 3.4    Current PCP: MACO An CNP  PCP verified by CM? Yes (goes to [address] [place] [address])    Chart Reviewed: Yes      History Provided by: Patient  Patient Orientation: Alert and Oriented    Patient Cognition: Alert    Hospitalization in the last 30 days (Readmission):  Yes    If yes, Readmission Assessment in CM Navigator will be completed. Advance Directives:      Code Status: Full Code   Patient's Primary Decision Maker is: Legal Next of Kin    Primary Decision Maker: Fabrizio Lombardo - Parent - [phone]    Primary Decision Maker: Courtney Hadley - Parent - [phone]    Discharge Planning:    Patient lives with: Alone Type of Home: Apartment  Primary Care Giver: Self  Patient Support Systems include: Parent, Family Members   Current Financial resources: None ([employer] , patient was unaware & has reapplied)  Current community resources: None  Current services prior to admission: None            Current DME:              Type of Home Care services:  None    ADLS  Prior functional level: Independent in ADLs/IADLs  Current functional level: Independent in ADLs/IADLs    PT AM-PAC:     OT AM-PAC:       Family can provide assistance at DC:  Yes  Would you like Case Management to discuss the discharge plan

## 2023-10-10 VITALS
TEMPERATURE: 98.1 F | DIASTOLIC BLOOD PRESSURE: 79 MMHG | WEIGHT: 160.72 LBS | SYSTOLIC BLOOD PRESSURE: 124 MMHG | OXYGEN SATURATION: 96 % | HEIGHT: 63 IN | BODY MASS INDEX: 28.48 KG/M2 | HEART RATE: 69 BPM | RESPIRATION RATE: 12 BRPM

## 2023-10-10 LAB
ALBUMIN SERPL-MCNC: 3.5 G/DL (ref 3.4–5)
ALBUMIN/GLOB SERPL: 1.3 {RATIO} (ref 1.1–2.2)
ALP SERPL-CCNC: 75 U/L (ref 40–129)
ALT SERPL-CCNC: 18 U/L (ref 10–40)
ANION GAP SERPL CALCULATED.3IONS-SCNC: 10 MMOL/L (ref 3–16)
AST SERPL-CCNC: 14 U/L (ref 15–37)
BASOPHILS # BLD: 0.1 K/UL (ref 0–0.2)
BASOPHILS NFR BLD: 1.3 %
BILIRUB SERPL-MCNC: <0.2 MG/DL (ref 0–1)
BUN SERPL-MCNC: 12 MG/DL (ref 7–20)
CALCIUM SERPL-MCNC: 10.2 MG/DL (ref 8.3–10.6)
CHLORIDE SERPL-SCNC: 107 MMOL/L (ref 99–110)
CO2 SERPL-SCNC: 21 MMOL/L (ref 21–32)
CREAT SERPL-MCNC: 0.5 MG/DL (ref 0.6–1.1)
DEPRECATED RDW RBC AUTO: 13.1 % (ref 12.4–15.4)
EOSINOPHIL # BLD: 0.3 K/UL (ref 0–0.6)
EOSINOPHIL NFR BLD: 3.9 %
GFR SERPLBLD CREATININE-BSD FMLA CKD-EPI: >60 ML/MIN/{1.73_M2}
GLUCOSE SERPL-MCNC: 89 MG/DL (ref 70–99)
HCT VFR BLD AUTO: 33.2 % (ref 36–48)
HGB BLD-MCNC: 11.3 G/DL (ref 12–16)
LYMPHOCYTES # BLD: 3.3 K/UL (ref 1–5.1)
LYMPHOCYTES NFR BLD: 44.2 %
MAGNESIUM SERPL-MCNC: 1.9 MG/DL (ref 1.8–2.4)
MCH RBC QN AUTO: 30.9 PG (ref 26–34)
MCHC RBC AUTO-ENTMCNC: 34 G/DL (ref 31–36)
MCV RBC AUTO: 91 FL (ref 80–100)
MONOCYTES # BLD: 0.6 K/UL (ref 0–1.3)
MONOCYTES NFR BLD: 8.1 %
NEUTROPHILS # BLD: 3.2 K/UL (ref 1.7–7.7)
NEUTROPHILS NFR BLD: 42.5 %
PHOSPHATE SERPL-MCNC: 3.2 MG/DL (ref 2.5–4.9)
PLATELET # BLD AUTO: 339 K/UL (ref 135–450)
PMV BLD AUTO: 8.8 FL (ref 5–10.5)
POTASSIUM SERPL-SCNC: 4.2 MMOL/L (ref 3.5–5.1)
PROT SERPL-MCNC: 6.2 G/DL (ref 6.4–8.2)
RBC # BLD AUTO: 3.65 M/UL (ref 4–5.2)
SODIUM SERPL-SCNC: 138 MMOL/L (ref 136–145)
WBC # BLD AUTO: 7.5 K/UL (ref 4–11)

## 2023-10-10 PROCEDURE — 6360000002 HC RX W HCPCS: Performed by: INTERNAL MEDICINE

## 2023-10-10 PROCEDURE — 36415 COLL VENOUS BLD VENIPUNCTURE: CPT

## 2023-10-10 PROCEDURE — 80053 COMPREHEN METABOLIC PANEL: CPT

## 2023-10-10 PROCEDURE — 85025 COMPLETE CBC W/AUTO DIFF WBC: CPT

## 2023-10-10 PROCEDURE — 84100 ASSAY OF PHOSPHORUS: CPT

## 2023-10-10 PROCEDURE — 83735 ASSAY OF MAGNESIUM: CPT

## 2023-10-10 PROCEDURE — 2580000003 HC RX 258: Performed by: INTERNAL MEDICINE

## 2023-10-10 PROCEDURE — 94760 N-INVAS EAR/PLS OXIMETRY 1: CPT

## 2023-10-10 RX ORDER — CEFDINIR 300 MG/1
300 CAPSULE ORAL EVERY 12 HOURS SCHEDULED
Qty: 14 CAPSULE | Refills: 0 | Status: SHIPPED | OUTPATIENT
Start: 2023-10-11 | End: 2023-10-18

## 2023-10-10 RX ORDER — OXYCODONE HYDROCHLORIDE AND ACETAMINOPHEN 5; 325 MG/1; MG/1
1 TABLET ORAL EVERY 6 HOURS PRN
Qty: 20 TABLET | Refills: 0 | Status: SHIPPED | OUTPATIENT
Start: 2023-10-10 | End: 2023-10-17

## 2023-10-10 RX ORDER — ONDANSETRON 4 MG/1
4 TABLET, ORALLY DISINTEGRATING ORAL EVERY 8 HOURS PRN
Qty: 20 TABLET | Refills: 0 | Status: SHIPPED | OUTPATIENT
Start: 2023-10-10

## 2023-10-10 RX ORDER — CEFDINIR 300 MG/1
300 CAPSULE ORAL EVERY 12 HOURS SCHEDULED
Status: DISCONTINUED | OUTPATIENT
Start: 2023-10-11 | End: 2023-10-10 | Stop reason: HOSPADM

## 2023-10-10 RX ADMIN — CEFTRIAXONE SODIUM 2000 MG: 2 INJECTION, POWDER, FOR SOLUTION INTRAMUSCULAR; INTRAVENOUS at 08:03

## 2023-10-10 ASSESSMENT — PAIN SCALES - GENERAL
PAINLEVEL_OUTOF10: 4
PAINLEVEL_OUTOF10: 4

## 2023-10-10 NOTE — PROGRESS NOTES
Discharge orders acknowledged by RN . Discharge teaching completed with pt and family. AVS reviewed and all questions answered. Medication regimen reviewed and pt understands schedule. Follow up appointments also reviewed with pt and resources given for discharge. Pt was sent electronic to be filled and understands schedule. IV removed. Bedside monitor removed from pt. 60+ minutes of education completed. Required core measures completed. Pt vitals WDL. Pt discharged with all belongings to home with family support. Pt transported off of unit via self ambulation. No complications.      Electronically signed by Dejon Fuchs RN on 10/10/2023 at 1:04 PM

## 2023-10-10 NOTE — PLAN OF CARE
Problem: Discharge Planning  Goal: Discharge to home or other facility with appropriate resources  10/10/2023 1047 by Dipti Nation RN  Outcome: Adequate for Discharge  10/10/2023 0102 by Mehul Steward RN  Outcome: Progressing  Flowsheets (Taken 10/9/2023 2110)  Discharge to home or other facility with appropriate resources: Identify barriers to discharge with patient and caregiver     Problem: Safety - Adult  Goal: Free from fall injury  10/10/2023 1047 by Dipti Nation RN  Outcome: Adequate for Discharge  10/10/2023 0102 by Mehul Steward RN  Outcome: Progressing     Problem: Pain  Goal: Verbalizes/displays adequate comfort level or baseline comfort level  10/10/2023 1047 by Dipti Nation RN  Outcome: Adequate for Discharge  10/10/2023 0102 by Mehul Steward RN  Outcome: Progressing     Problem: ABCDS Injury Assessment  Goal: Absence of physical injury  10/10/2023 1047 by Dipti Nation RN  Outcome: Adequate for Discharge  10/10/2023 0102 by Mehul Steward RN  Outcome: Progressing

## 2023-10-10 NOTE — PLAN OF CARE
Problem: Discharge Planning  Goal: Discharge to home or other facility with appropriate resources  Outcome: Progressing  Flowsheets (Taken 10/9/2023 2110)  Discharge to home or other facility with appropriate resources: Identify barriers to discharge with patient and caregiver     Problem: Safety - Adult  Goal: Free from fall injury  Outcome: Progressing     Problem: Pain  Goal: Verbalizes/displays adequate comfort level or baseline comfort level  Outcome: Progressing     Problem: ABCDS Injury Assessment  Goal: Absence of physical injury  Outcome: Progressing

## 2023-10-10 NOTE — CARE COORDINATION
CASE MANAGEMENT DISCHARGE SUMMARY:    DISCHARGE DATE: 10/10/23    DISCHARGED TO: Home independently     TRANSPORTATION: Mom             TIME: TBD    COMMENTS: Patient to discharge home today. Shad Whipple working on [place]. Patient given [facility] office number to follow up if needed. BK Presley made aware.      Electronically signed by Compa Aj RN Case Management on 10/10/2023 at 12:44 PM

## 2023-10-10 NOTE — PROGRESS NOTES
[facility] Medicine Progress Note     Date:  10/10/2023    PCP: MACO Ayoub CNP (Tel: [phone])    Date of Admission: 10/8/2023    Chief complaint:   Chief Complaint   Patient presents with    Flank Pain    Hematuria     Pt to ED with c/o nausea, fatigue and right side flank pain that radiates to mid abd. States symptoms started yesterday, progressively worsening. Recent stent placement. Brief admission history: 40-year-old female with history of kidney stones, recent Klebsiella urinary tract infection/pyelonephritis (discharged on 9/26/2023 following ureteral stent placement for obstructing ureteral stone, hydronephrosis), who presents to the emergency room with complaints of dysuria, hematuria, fever, chills, nausea, right flank discomfort. Urinalysis is abnormal.  She has mild leukocytosis of 12,600, calcium of 10.8. CT-abdomen/pelvis revealed right ureteral stent and improved right hydronephrosis; mild bladder wall thickening concerning for cystitis. Assessment/plan:  Acute pyelonephritis. She was initially on rocephin. So far, urine cultures not growing any organism. She has been transitioned to cefdinir, to complete course as outpatient. Hypercalcemia, secondary to dehydration, and has since resolved with intravenous fluid. Recently treated Klebsiella UTI and recent ureteral stent placement for right ureteral lithiasis and hydronephrosis. Overall, hydronephrosis appears improved on imaging. Close follow-up with urology service as outpatient. Other comorbidities: history of kidney stones, recent Klebsiella urinary tract infection/pyelonephritis (discharged on 9/26/2023 following ureteral stent placement for obstructing ureteral stone, hydronephrosis). Disposition. Stable for discharge home. Diet: ADULT DIET; Regular    Code status: Full Code   ----------  Subjective  Back pain much better. She feels better.     Objective  Physical exam:  Vitals: BP (!) 136/92

## 2023-10-11 NOTE — PROGRESS NOTES
Physician Progress Note      Kallie PEÑA #:                  [id number]  :                       2000  ADMIT DATE:       10/8/2023 7:36 AM  DISCH DATE:        10/10/2023 1:09 PM  RESPONDING  PROVIDER #:        Kingsley Carter MD          QUERY TEXT:    Pt admitted with Acute pyelonephritis. Pt noted to have  a right ureteral   stent . If possible, please document in the progress notes and discharge   summary if you are evaluating and/or treating any of the following: The medical record reflects the following:  Risk Factors: Acute pyelonephritis  Clinical Indicators: H&P note noted as Acute pyelonephritis. CT of abdominal   noted as Interval placement of a right ureteral stent with minimal residual   hydronephrosis. Treatment:  Continue Rocephin, Urology consult  Options provided:  -- Acute pyelonephritis due to ureteral stent  -- Acute pyelonephritis not due to ureteral stent  -- Other - I will add my own diagnosis  -- Disagree - Not applicable / Not valid  -- Disagree - Clinically unable to determine / Unknown  -- Refer to Clinical Documentation Reviewer    PROVIDER RESPONSE TEXT:    Acute pyelonephritis is not due to ureteral stent. Query created by:  Randal Palacios on 10/11/2023 5:55 AM      Electronically signed by:  Kingsley Carter MD 10/11/2023 9:30 AM

## 2023-10-31 ENCOUNTER — APPOINTMENT (OUTPATIENT)
Dept: CT IMAGING | Age: 23
End: 2023-10-31

## 2023-10-31 ENCOUNTER — HOSPITAL ENCOUNTER (EMERGENCY)
Age: 23
Discharge: ANOTHER HOSPITAL | End: 2023-10-31
Attending: EMERGENCY MEDICINE

## 2023-10-31 VITALS
HEART RATE: 65 BPM | TEMPERATURE: 98.8 F | WEIGHT: 157.85 LBS | HEIGHT: 63 IN | OXYGEN SATURATION: 100 % | SYSTOLIC BLOOD PRESSURE: 105 MMHG | RESPIRATION RATE: 16 BRPM | DIASTOLIC BLOOD PRESSURE: 63 MMHG | BODY MASS INDEX: 27.97 KG/M2

## 2023-10-31 DIAGNOSIS — N13.30 HYDRONEPHROSIS OF RIGHT KIDNEY: Primary | ICD-10-CM

## 2023-10-31 LAB
ALBUMIN SERPL-MCNC: 3.7 G/DL (ref 3.4–5)
ALP SERPL-CCNC: 85 U/L (ref 40–129)
ALT SERPL-CCNC: 11 U/L (ref 10–40)
ANION GAP SERPL CALCULATED.3IONS-SCNC: 11 MMOL/L (ref 3–16)
AST SERPL-CCNC: 28 U/L (ref 15–37)
BACTERIA URNS QL MICRO: ABNORMAL /HPF
BASOPHILS # BLD: 0.1 K/UL (ref 0–0.2)
BASOPHILS NFR BLD: 1 %
BILIRUB DIRECT SERPL-MCNC: <0.2 MG/DL (ref 0–0.3)
BILIRUB INDIRECT SERPL-MCNC: NORMAL MG/DL (ref 0–1)
BILIRUB SERPL-MCNC: <0.2 MG/DL (ref 0–1)
BILIRUB UR QL STRIP.AUTO: ABNORMAL
BUN SERPL-MCNC: 15 MG/DL (ref 7–20)
CALCIUM SERPL-MCNC: 10.2 MG/DL (ref 8.3–10.6)
CHLORIDE SERPL-SCNC: 107 MMOL/L (ref 99–110)
CLARITY UR: CLEAR
CO2 SERPL-SCNC: 19 MMOL/L (ref 21–32)
COLOR UR: YELLOW
CREAT SERPL-MCNC: 0.8 MG/DL (ref 0.6–1.1)
DEPRECATED RDW RBC AUTO: 13.4 % (ref 12.4–15.4)
EOSINOPHIL # BLD: 0.3 K/UL (ref 0–0.6)
EOSINOPHIL NFR BLD: 3.4 %
EPI CELLS #/AREA URNS HPF: ABNORMAL /HPF (ref 0–5)
GFR SERPLBLD CREATININE-BSD FMLA CKD-EPI: >60 ML/MIN/{1.73_M2}
GLUCOSE SERPL-MCNC: 109 MG/DL (ref 70–99)
GLUCOSE UR STRIP.AUTO-MCNC: NEGATIVE MG/DL
HCG UR QL: NEGATIVE
HCT VFR BLD AUTO: 35.2 % (ref 36–48)
HGB BLD-MCNC: 11.8 G/DL (ref 12–16)
HGB UR QL STRIP.AUTO: ABNORMAL
KETONES UR STRIP.AUTO-MCNC: NEGATIVE MG/DL
LEUKOCYTE ESTERASE UR QL STRIP.AUTO: ABNORMAL
LIPASE SERPL-CCNC: 30 U/L (ref 13–60)
LYMPHOCYTES # BLD: 3.1 K/UL (ref 1–5.1)
LYMPHOCYTES NFR BLD: 37.7 %
MCH RBC QN AUTO: 30.4 PG (ref 26–34)
MCHC RBC AUTO-ENTMCNC: 33.7 G/DL (ref 31–36)
MCV RBC AUTO: 90.4 FL (ref 80–100)
MONOCYTES # BLD: 0.7 K/UL (ref 0–1.3)
MONOCYTES NFR BLD: 8.4 %
NEUTROPHILS # BLD: 4.1 K/UL (ref 1.7–7.7)
NEUTROPHILS NFR BLD: 49.5 %
NITRITE UR QL STRIP.AUTO: NEGATIVE
PH UR STRIP.AUTO: 6 [PH] (ref 5–8)
PLATELET # BLD AUTO: 356 K/UL (ref 135–450)
PMV BLD AUTO: 8.3 FL (ref 5–10.5)
POTASSIUM SERPL-SCNC: 5.3 MMOL/L (ref 3.5–5.1)
PROT SERPL-MCNC: 7 G/DL (ref 6.4–8.2)
PROT UR STRIP.AUTO-MCNC: 100 MG/DL
RBC # BLD AUTO: 3.89 M/UL (ref 4–5.2)
RBC #/AREA URNS HPF: ABNORMAL /HPF (ref 0–4)
SODIUM SERPL-SCNC: 137 MMOL/L (ref 136–145)
SP GR UR STRIP.AUTO: >=1.03 (ref 1–1.03)
UA COMPLETE W REFLEX CULTURE PNL UR: ABNORMAL
UA DIPSTICK W REFLEX MICRO PNL UR: YES
URN SPEC COLLECT METH UR: ABNORMAL
UROBILINOGEN UR STRIP-ACNC: 0.2 E.U./DL
WBC # BLD AUTO: 8.2 K/UL (ref 4–11)
WBC #/AREA URNS HPF: ABNORMAL /HPF (ref 0–5)

## 2023-10-31 PROCEDURE — 96375 TX/PRO/DX INJ NEW DRUG ADDON: CPT

## 2023-10-31 PROCEDURE — 6360000002 HC RX W HCPCS: Performed by: EMERGENCY MEDICINE

## 2023-10-31 PROCEDURE — 99285 EMERGENCY DEPT VISIT HI MDM: CPT

## 2023-10-31 PROCEDURE — 85025 COMPLETE CBC W/AUTO DIFF WBC: CPT

## 2023-10-31 PROCEDURE — 6360000004 HC RX CONTRAST MEDICATION: Performed by: EMERGENCY MEDICINE

## 2023-10-31 PROCEDURE — 74177 CT ABD & PELVIS W/CONTRAST: CPT

## 2023-10-31 PROCEDURE — 96376 TX/PRO/DX INJ SAME DRUG ADON: CPT

## 2023-10-31 PROCEDURE — 96374 THER/PROPH/DIAG INJ IV PUSH: CPT

## 2023-10-31 PROCEDURE — 84703 CHORIONIC GONADOTROPIN ASSAY: CPT

## 2023-10-31 PROCEDURE — 80076 HEPATIC FUNCTION PANEL: CPT

## 2023-10-31 PROCEDURE — 80048 BASIC METABOLIC PNL TOTAL CA: CPT

## 2023-10-31 PROCEDURE — 81001 URINALYSIS AUTO W/SCOPE: CPT

## 2023-10-31 PROCEDURE — 6370000000 HC RX 637 (ALT 250 FOR IP): Performed by: EMERGENCY MEDICINE

## 2023-10-31 PROCEDURE — 36415 COLL VENOUS BLD VENIPUNCTURE: CPT

## 2023-10-31 PROCEDURE — 83690 ASSAY OF LIPASE: CPT

## 2023-10-31 RX ORDER — ONDANSETRON 2 MG/ML
4 INJECTION INTRAMUSCULAR; INTRAVENOUS ONCE
Status: COMPLETED | OUTPATIENT
Start: 2023-10-31 | End: 2023-10-31

## 2023-10-31 RX ORDER — KETOROLAC TROMETHAMINE 30 MG/ML
30 INJECTION, SOLUTION INTRAMUSCULAR; INTRAVENOUS ONCE
Status: COMPLETED | OUTPATIENT
Start: 2023-10-31 | End: 2023-10-31

## 2023-10-31 RX ORDER — CIPROFLOXACIN 500 MG/1
500 TABLET, FILM COATED ORAL ONCE
Status: COMPLETED | OUTPATIENT
Start: 2023-10-31 | End: 2023-10-31

## 2023-10-31 RX ORDER — KETOROLAC TROMETHAMINE 30 MG/ML
30 INJECTION, SOLUTION INTRAMUSCULAR; INTRAVENOUS ONCE
Status: DISCONTINUED | OUTPATIENT
Start: 2023-10-31 | End: 2023-10-31 | Stop reason: SDUPTHER

## 2023-10-31 RX ORDER — KETOROLAC TROMETHAMINE 15 MG/ML
30 INJECTION, SOLUTION INTRAMUSCULAR; INTRAVENOUS ONCE
Status: COMPLETED | OUTPATIENT
Start: 2023-10-31 | End: 2023-10-31

## 2023-10-31 RX ADMIN — ONDANSETRON 4 MG: 2 INJECTION INTRAMUSCULAR; INTRAVENOUS at 03:55

## 2023-10-31 RX ADMIN — IOMEPROL INJECTION 100 ML: 714 INJECTION, SOLUTION INTRAVASCULAR at 04:35

## 2023-10-31 RX ADMIN — KETOROLAC TROMETHAMINE 30 MG: 60 INJECTION, SOLUTION INTRAMUSCULAR at 03:51

## 2023-10-31 RX ADMIN — KETOROLAC TROMETHAMINE 30 MG: 15 INJECTION, SOLUTION INTRAMUSCULAR; INTRAVENOUS at 09:47

## 2023-10-31 RX ADMIN — CIPROFLOXACIN 500 MG: 500 TABLET, FILM COATED ORAL at 10:20

## 2023-10-31 ASSESSMENT — PAIN DESCRIPTION - ORIENTATION
ORIENTATION: LOWER
ORIENTATION: RIGHT

## 2023-10-31 ASSESSMENT — PAIN - FUNCTIONAL ASSESSMENT
PAIN_FUNCTIONAL_ASSESSMENT: 0-10
PAIN_FUNCTIONAL_ASSESSMENT: 0-10
PAIN_FUNCTIONAL_ASSESSMENT: ACTIVITIES ARE NOT PREVENTED

## 2023-10-31 ASSESSMENT — PAIN SCALES - GENERAL
PAINLEVEL_OUTOF10: 5
PAINLEVEL_OUTOF10: 3
PAINLEVEL_OUTOF10: 3
PAINLEVEL_OUTOF10: 6
PAINLEVEL_OUTOF10: 0
PAINLEVEL_OUTOF10: 10

## 2023-10-31 ASSESSMENT — ENCOUNTER SYMPTOMS
SHORTNESS OF BREATH: 0
EYE PAIN: 0
BACK PAIN: 0
NAUSEA: 1
DIARRHEA: 0
EYE REDNESS: 0
ABDOMINAL PAIN: 1
COUGH: 0
CONSTIPATION: 0
RHINORRHEA: 0
VOMITING: 0

## 2023-10-31 ASSESSMENT — PAIN DESCRIPTION - LOCATION
LOCATION: FLANK
LOCATION: ABDOMEN

## 2023-10-31 ASSESSMENT — PAIN DESCRIPTION - DESCRIPTORS
DESCRIPTORS: ACHING
DESCRIPTORS: SQUEEZING

## 2023-10-31 NOTE — ED NOTES
Pt updated on bed status , pt is starting to have pain again  aware      Rakesh Lewis, BK  10/31/23 8330

## 2023-10-31 NOTE — ED NOTES
Report given to Maximiliano Reynolds RN she denies questions pts mom is driving her pt is alert and orient x 4 he gait is steady at d/c she denies questions, RN gave pt a large envelope with transfer paperwork. She denies questions .       Jocelyn Brady RN  10/31/23 1100

## 2023-10-31 NOTE — ED NOTES
Pt request to be admitted to the [address] [place] [address], she stated she didn't have a insurance coverage at [employer] but she can apply for financial assistance at [facility], provider aware, transfer center informed.      Jesus Go RN  10/31/23 0968

## 2023-10-31 NOTE — ED NOTES
Attempted report, they are unable to take it at this time will call us back      Viviano Gitelman, BK  10/31/23 1024

## 2023-10-31 NOTE — ED NOTES
Pt resting in bed no s.s of distress noted , mom at bedside no request at this time      Jocelyn Brady RN  10/31/23 9374

## 2024-02-26 ENCOUNTER — APPOINTMENT (OUTPATIENT)
Dept: CT IMAGING | Age: 24
End: 2024-02-26

## 2024-02-26 ENCOUNTER — HOSPITAL ENCOUNTER (EMERGENCY)
Age: 24
Discharge: HOME | End: 2024-02-26
Attending: EMERGENCY MEDICINE

## 2024-02-26 VITALS
HEART RATE: 80 BPM | TEMPERATURE: 98.2 F | OXYGEN SATURATION: 95 % | SYSTOLIC BLOOD PRESSURE: 145 MMHG | DIASTOLIC BLOOD PRESSURE: 96 MMHG | RESPIRATION RATE: 16 BRPM

## 2024-02-26 DIAGNOSIS — N23 RENAL COLIC ON LEFT SIDE: ICD-10-CM

## 2024-02-26 DIAGNOSIS — N20.1 LEFT URETERAL CALCULUS: Primary | ICD-10-CM

## 2024-02-26 DIAGNOSIS — E83.52 HYPERCALCEMIA: ICD-10-CM

## 2024-02-26 DIAGNOSIS — N30.01 ACUTE CYSTITIS WITH HEMATURIA: ICD-10-CM

## 2024-02-26 LAB
ALBUMIN SERPL-MCNC: 4.2 G/DL (ref 3.4–5)
ALBUMIN/GLOB SERPL: 1.5 {RATIO} (ref 1.1–2.2)
ALP SERPL-CCNC: 90 U/L (ref 40–129)
ALT SERPL-CCNC: 30 U/L (ref 10–40)
ANION GAP SERPL CALCULATED.3IONS-SCNC: 11 MMOL/L (ref 3–16)
AST SERPL-CCNC: 18 U/L (ref 15–37)
BACTERIA URNS QL MICRO: ABNORMAL /HPF
BASOPHILS # BLD: 0 K/UL (ref 0–0.2)
BASOPHILS NFR BLD: 0 %
BILIRUB SERPL-MCNC: <0.2 MG/DL (ref 0–1)
BILIRUB UR QL STRIP.AUTO: NEGATIVE
BUN SERPL-MCNC: 15 MG/DL (ref 7–20)
CALCIUM SERPL-MCNC: 11.1 MG/DL (ref 8.3–10.6)
CHARACTER UR: ABNORMAL
CHLORIDE SERPL-SCNC: 106 MMOL/L (ref 99–110)
CLARITY UR: CLEAR
CO2 SERPL-SCNC: 24 MMOL/L (ref 21–32)
COLOR UR: YELLOW
CREAT SERPL-MCNC: 1 MG/DL (ref 0.6–1.1)
DEPRECATED RDW RBC AUTO: 14.4 % (ref 12.4–15.4)
EOSINOPHIL # BLD: 0 K/UL (ref 0–0.6)
EOSINOPHIL NFR BLD: 0 %
EPI CELLS #/AREA URNS HPF: ABNORMAL /HPF (ref 0–5)
GFR SERPLBLD CREATININE-BSD FMLA CKD-EPI: >60 ML/MIN/{1.73_M2}
GLUCOSE SERPL-MCNC: 110 MG/DL (ref 70–99)
GLUCOSE UR STRIP.AUTO-MCNC: NEGATIVE MG/DL
HCG SERPL QL: NEGATIVE
HCT VFR BLD AUTO: 38.4 % (ref 36–48)
HGB BLD-MCNC: 12.5 G/DL (ref 12–16)
HGB UR QL STRIP.AUTO: ABNORMAL
KETONES UR STRIP.AUTO-MCNC: NEGATIVE MG/DL
LEUKOCYTE ESTERASE UR QL STRIP.AUTO: NEGATIVE
LIPASE SERPL-CCNC: 22 U/L (ref 13–60)
LYMPHOCYTES # BLD: 5.4 K/UL (ref 1–5.1)
LYMPHOCYTES NFR BLD: 23 %
MCH RBC QN AUTO: 29.2 PG (ref 26–34)
MCHC RBC AUTO-ENTMCNC: 32.5 G/DL (ref 31–36)
MCV RBC AUTO: 89.9 FL (ref 80–100)
MONOCYTES # BLD: 1 K/UL (ref 0–1.3)
MONOCYTES NFR BLD: 7 %
MYELOCYTES NFR BLD MANUAL: 1 %
NEUTROPHILS # BLD: 7.8 K/UL (ref 1.7–7.7)
NEUTROPHILS NFR BLD: 54 %
NITRITE UR QL STRIP.AUTO: NEGATIVE
PATH INTERP BLD-IMP: NORMAL
PATH INTERP BLD-IMP: YES
PH UR STRIP.AUTO: 5.5 [PH] (ref 5–8)
PLATELET # BLD AUTO: 320 K/UL (ref 135–450)
PMV BLD AUTO: 8.6 FL (ref 5–10.5)
POTASSIUM SERPL-SCNC: 3.6 MMOL/L (ref 3.5–5.1)
PROT SERPL-MCNC: 7 G/DL (ref 6.4–8.2)
PROT UR STRIP.AUTO-MCNC: NEGATIVE MG/DL
RBC # BLD AUTO: 4.27 M/UL (ref 4–5.2)
RBC #/AREA URNS HPF: ABNORMAL /HPF (ref 0–4)
RBC MORPH BLD: NORMAL
SLIDE REVIEW: ABNORMAL
SODIUM SERPL-SCNC: 141 MMOL/L (ref 136–145)
SP GR UR STRIP.AUTO: 1.01 (ref 1–1.03)
UA COMPLETE W REFLEX CULTURE PNL UR: ABNORMAL
UA DIPSTICK W REFLEX MICRO PNL UR: YES
URN SPEC COLLECT METH UR: ABNORMAL
UROBILINOGEN UR STRIP-ACNC: 0.2 E.U./DL
VARIANT LYMPHS NFR BLD MANUAL: 15 % (ref 0–6)
WBC # BLD AUTO: 14.1 K/UL (ref 4–11)
WBC #/AREA URNS HPF: ABNORMAL /HPF (ref 0–5)

## 2024-02-26 PROCEDURE — 83690 ASSAY OF LIPASE: CPT

## 2024-02-26 PROCEDURE — 96374 THER/PROPH/DIAG INJ IV PUSH: CPT

## 2024-02-26 PROCEDURE — 6360000004 HC RX CONTRAST MEDICATION: Performed by: EMERGENCY MEDICINE

## 2024-02-26 PROCEDURE — 80053 COMPREHEN METABOLIC PANEL: CPT

## 2024-02-26 PROCEDURE — 6360000002 HC RX W HCPCS: Performed by: EMERGENCY MEDICINE

## 2024-02-26 PROCEDURE — 99285 EMERGENCY DEPT VISIT HI MDM: CPT

## 2024-02-26 PROCEDURE — 96375 TX/PRO/DX INJ NEW DRUG ADDON: CPT

## 2024-02-26 PROCEDURE — 74177 CT ABD & PELVIS W/CONTRAST: CPT

## 2024-02-26 PROCEDURE — 85025 COMPLETE CBC W/AUTO DIFF WBC: CPT

## 2024-02-26 PROCEDURE — 81001 URINALYSIS AUTO W/SCOPE: CPT

## 2024-02-26 PROCEDURE — 2580000003 HC RX 258: Performed by: EMERGENCY MEDICINE

## 2024-02-26 PROCEDURE — 6370000000 HC RX 637 (ALT 250 FOR IP): Performed by: EMERGENCY MEDICINE

## 2024-02-26 PROCEDURE — 84703 CHORIONIC GONADOTROPIN ASSAY: CPT

## 2024-02-26 RX ORDER — ONDANSETRON 8 MG/1
8 TABLET, ORALLY DISINTEGRATING ORAL EVERY 8 HOURS PRN
Qty: 12 TABLET | Refills: 0 | Status: SHIPPED | OUTPATIENT
Start: 2024-02-26 | End: 2024-03-01

## 2024-02-26 RX ORDER — OXYCODONE HYDROCHLORIDE AND ACETAMINOPHEN 5; 325 MG/1; MG/1
1 TABLET ORAL EVERY 6 HOURS PRN
Qty: 12 TABLET | Refills: 0 | Status: SHIPPED | OUTPATIENT
Start: 2024-02-26 | End: 2024-02-29

## 2024-02-26 RX ORDER — IBUPROFEN 800 MG/1
800 TABLET ORAL 3 TIMES DAILY PRN
Qty: 15 TABLET | Refills: 0 | Status: SHIPPED | OUTPATIENT
Start: 2024-02-26 | End: 2024-03-02

## 2024-02-26 RX ORDER — OXYCODONE HYDROCHLORIDE AND ACETAMINOPHEN 5; 325 MG/1; MG/1
1 TABLET ORAL ONCE
Status: COMPLETED | OUTPATIENT
Start: 2024-02-26 | End: 2024-02-26

## 2024-02-26 RX ORDER — TAMSULOSIN HYDROCHLORIDE 0.4 MG/1
0.4 CAPSULE ORAL DAILY
Qty: 7 CAPSULE | Refills: 0 | Status: SHIPPED | OUTPATIENT
Start: 2024-02-26 | End: 2024-03-04

## 2024-02-26 RX ORDER — 0.9 % SODIUM CHLORIDE 0.9 %
1000 INTRAVENOUS SOLUTION INTRAVENOUS ONCE
Status: COMPLETED | OUTPATIENT
Start: 2024-02-26 | End: 2024-02-26

## 2024-02-26 RX ORDER — CIPROFLOXACIN 500 MG/1
500 TABLET, FILM COATED ORAL ONCE
Status: COMPLETED | OUTPATIENT
Start: 2024-02-26 | End: 2024-02-26

## 2024-02-26 RX ORDER — CIPROFLOXACIN 500 MG/1
500 TABLET, FILM COATED ORAL 2 TIMES DAILY
Qty: 14 TABLET | Refills: 0 | Status: SHIPPED | OUTPATIENT
Start: 2024-02-26 | End: 2024-03-07

## 2024-02-26 RX ORDER — KETOROLAC TROMETHAMINE 30 MG/ML
30 INJECTION, SOLUTION INTRAMUSCULAR; INTRAVENOUS ONCE
Status: COMPLETED | OUTPATIENT
Start: 2024-02-26 | End: 2024-02-26

## 2024-02-26 RX ADMIN — SODIUM CHLORIDE 1000 ML: 9 INJECTION, SOLUTION INTRAVENOUS at 01:15

## 2024-02-26 RX ADMIN — CIPROFLOXACIN HYDROCHLORIDE 500 MG: 500 TABLET, FILM COATED ORAL at 03:20

## 2024-02-26 RX ADMIN — OXYCODONE HYDROCHLORIDE AND ACETAMINOPHEN 1 TABLET: 5; 325 TABLET ORAL at 03:20

## 2024-02-26 RX ADMIN — IOMEPROL INJECTION 100 ML: 714 INJECTION, SOLUTION INTRAVASCULAR at 02:06

## 2024-02-26 RX ADMIN — HYDROMORPHONE HYDROCHLORIDE 1 MG: 1 INJECTION, SOLUTION INTRAMUSCULAR; INTRAVENOUS; SUBCUTANEOUS at 01:15

## 2024-02-26 RX ADMIN — KETOROLAC TROMETHAMINE 30 MG: 30 INJECTION, SOLUTION INTRAMUSCULAR; INTRAVENOUS at 01:14

## 2024-02-26 ASSESSMENT — PAIN SCALES - GENERAL
PAINLEVEL_OUTOF10: 4
PAINLEVEL_OUTOF10: 10
PAINLEVEL_OUTOF10: 5
PAINLEVEL_OUTOF10: 7

## 2024-02-26 ASSESSMENT — PAIN DESCRIPTION - LOCATION
LOCATION: FLANK

## 2024-02-26 ASSESSMENT — PAIN - FUNCTIONAL ASSESSMENT: PAIN_FUNCTIONAL_ASSESSMENT: 0-10

## 2024-02-26 ASSESSMENT — PAIN DESCRIPTION - PAIN TYPE
TYPE: ACUTE PAIN
TYPE: ACUTE PAIN;CHRONIC PAIN

## 2024-02-26 ASSESSMENT — PAIN DESCRIPTION - ORIENTATION: ORIENTATION: RIGHT

## 2024-02-26 ASSESSMENT — PAIN DESCRIPTION - DESCRIPTORS
DESCRIPTORS: SHARP;STABBING
DESCRIPTORS: ACHING

## 2024-02-26 NOTE — DISCHARGE INSTRUCTIONS
You have been administered medications in the emergency department that may cause drowsiness. Do not be driving, operating heavy machinery, swimming, caring for small children, or engaging in dangerous activities of any type until these medications have worn off. This may be as long as 6-8 hours.    You have been prescribed medications that may cause drowsiness. Do not be driving, operating heavy machinery, swimming, caring for small children, or engaging in dangerous activities of any type until these medications have worn off. This may be as long as 6-8 hours.    Do not take Tylenol (acetaminophen) with these medications. Tylenol is a component of this medication and this may cause an overdose of acetaminophen.    Do not take ibuprofen with the medications that you have been prescribed because they are similar medications.

## 2024-02-26 NOTE — ED PROVIDER NOTES
Gastric Outlet Obstruction), Acute Cholecystitis, appendicitis, pancreatitis, Colitis, Splanchnic Vein Thrombosis, ureteral obstruction, pelvic inflammatory disease, any other pathology will cause the patient's symptoms.    CBC-CBC was ordered to rule out anemia, infection, abnormal platelet count, polycythemia, abnormal Red cell pathology, or any other pathology that might be causing the patient's symptoms    CMP-CMP was ordered to rule out electrolyte abnormalities, liver dysfunction, kidney dysfunction, electrolyte imbalance, abnormal transaminases, or any other pathology that might be causing the patient's symptoms.    Urine-urinalysis was ordered to rule out infection, renal failure, dehydration, pregnancy, proteinuria, bilirubinuria, or any other pathology that might be causing the patient's symptoms    The patient's blood pressure was found to be elevated according to CMS/Medicare and the Affordable Care Act/[facility] criteria. Elevated blood pressure could occur because of pain or anxiety or other reasons and does not mean that they need to have their blood pressure treated or medications otherwise adjusted. However, this could also be a sign that they will need to have their blood pressure treated or medications changed.    The patient was instructed to follow up closely with their personal physician to have their blood pressure rechecked. The patient was instructed to take a list of recent blood pressure readings to their next visit with their personal physician.    See discharge instructions for specific medications, discharge information, and treatments. They were verbally instructed to return to emergency if any problems.    I reviewed old records     (This chart has been completed using [employer]. Although attempts have been made to ensure accuracy, words and/or phrases may not be transcribed as intended.)    Patient neither refused nor requested pain medicines at the time of

## 2024-02-26 NOTE — PROGRESS NOTES
DC, RX and follow-up instructions given. Pt verbalized understanding. DC IV DC home in stable condition and with a safe ride from family.

## 2024-02-26 NOTE — PROGRESS NOTES
PIV per RN w aseptic technique.    Labs sent    Meds given per MD order.    Pt resting w family at bedside and monitor in place.

## 2025-02-08 ENCOUNTER — HOSPITAL ENCOUNTER (EMERGENCY)
Age: 25
Discharge: HOME | End: 2025-02-08
Attending: EMERGENCY MEDICINE
Payer: COMMERCIAL

## 2025-02-08 ENCOUNTER — APPOINTMENT (OUTPATIENT)
Dept: CT IMAGING | Age: 25
End: 2025-02-08
Payer: COMMERCIAL

## 2025-02-08 VITALS
HEART RATE: 59 BPM | SYSTOLIC BLOOD PRESSURE: 114 MMHG | RESPIRATION RATE: 14 BRPM | OXYGEN SATURATION: 97 % | WEIGHT: 174.6 LBS | DIASTOLIC BLOOD PRESSURE: 68 MMHG | BODY MASS INDEX: 30.94 KG/M2 | HEIGHT: 63 IN | TEMPERATURE: 98.6 F

## 2025-02-08 DIAGNOSIS — N20.0 KIDNEY STONE: Primary | ICD-10-CM

## 2025-02-08 LAB
ALBUMIN SERPL-MCNC: 4.2 G/DL (ref 3.4–5)
ALBUMIN/GLOB SERPL: 1.5 {RATIO} (ref 1.1–2.2)
ALP SERPL-CCNC: 106 U/L (ref 40–129)
ALT SERPL-CCNC: 94 U/L (ref 10–40)
ANION GAP SERPL CALCULATED.3IONS-SCNC: 10 MMOL/L (ref 3–16)
AST SERPL-CCNC: 224 U/L (ref 15–37)
BACTERIA URNS QL MICRO: ABNORMAL /HPF
BASOPHILS # BLD: 0.1 K/UL (ref 0–0.2)
BASOPHILS NFR BLD: 0.8 %
BILIRUB SERPL-MCNC: 0.3 MG/DL (ref 0–1)
BILIRUB UR QL STRIP.AUTO: NEGATIVE
BUN SERPL-MCNC: 14 MG/DL (ref 7–20)
CALCIUM SERPL-MCNC: 9.7 MG/DL (ref 8.3–10.6)
CHLORIDE SERPL-SCNC: 104 MMOL/L (ref 99–110)
CLARITY UR: CLEAR
CO2 SERPL-SCNC: 24 MMOL/L (ref 21–32)
COLOR UR: YELLOW
CREAT SERPL-MCNC: 0.8 MG/DL (ref 0.6–1.1)
DEPRECATED RDW RBC AUTO: 13.3 % (ref 12.4–15.4)
EOSINOPHIL # BLD: 0.2 K/UL (ref 0–0.6)
EOSINOPHIL NFR BLD: 1.2 %
GFR SERPLBLD CREATININE-BSD FMLA CKD-EPI: >90 ML/MIN/{1.73_M2}
GLUCOSE SERPL-MCNC: 113 MG/DL (ref 70–99)
GLUCOSE UR STRIP.AUTO-MCNC: NEGATIVE MG/DL
HCG SERPL QL: NEGATIVE
HCT VFR BLD AUTO: 40.5 % (ref 36–48)
HGB BLD-MCNC: 13.5 G/DL (ref 12–16)
HGB UR QL STRIP.AUTO: ABNORMAL
KETONES UR STRIP.AUTO-MCNC: NEGATIVE MG/DL
LEUKOCYTE ESTERASE UR QL STRIP.AUTO: NEGATIVE
LIPASE SERPL-CCNC: 26 U/L (ref 13–60)
LYMPHOCYTES # BLD: 1.8 K/UL (ref 1–5.1)
LYMPHOCYTES NFR BLD: 14.3 %
MCH RBC QN AUTO: 31.2 PG (ref 26–34)
MCHC RBC AUTO-ENTMCNC: 33.2 G/DL (ref 31–36)
MCV RBC AUTO: 94 FL (ref 80–100)
MONOCYTES # BLD: 0.8 K/UL (ref 0–1.3)
MONOCYTES NFR BLD: 6.3 %
MUCOUS THREADS #/AREA URNS LPF: ABNORMAL /LPF
NEUTROPHILS # BLD: 10 K/UL (ref 1.7–7.7)
NEUTROPHILS NFR BLD: 77.4 %
NITRITE UR QL STRIP.AUTO: NEGATIVE
PH UR STRIP.AUTO: 6 [PH] (ref 5–8)
PLATELET # BLD AUTO: 302 K/UL (ref 135–450)
PMV BLD AUTO: 8.6 FL (ref 5–10.5)
POTASSIUM SERPL-SCNC: 3.9 MMOL/L (ref 3.5–5.1)
PROT SERPL-MCNC: 7 G/DL (ref 6.4–8.2)
PROT UR STRIP.AUTO-MCNC: 30 MG/DL
RBC # BLD AUTO: 4.31 M/UL (ref 4–5.2)
RBC #/AREA URNS HPF: ABNORMAL /HPF (ref 0–4)
SODIUM SERPL-SCNC: 138 MMOL/L (ref 136–145)
SP GR UR STRIP.AUTO: 1.02 (ref 1–1.03)
UA COMPLETE W REFLEX CULTURE PNL UR: ABNORMAL
UA DIPSTICK W REFLEX MICRO PNL UR: YES
URN SPEC COLLECT METH UR: ABNORMAL
UROBILINOGEN UR STRIP-ACNC: 0.2 E.U./DL
WBC # BLD AUTO: 12.9 K/UL (ref 4–11)
WBC #/AREA URNS HPF: ABNORMAL /HPF (ref 0–5)

## 2025-02-08 PROCEDURE — 85025 COMPLETE CBC W/AUTO DIFF WBC: CPT

## 2025-02-08 PROCEDURE — 80053 COMPREHEN METABOLIC PANEL: CPT

## 2025-02-08 PROCEDURE — 83690 ASSAY OF LIPASE: CPT

## 2025-02-08 PROCEDURE — 96374 THER/PROPH/DIAG INJ IV PUSH: CPT

## 2025-02-08 PROCEDURE — 84703 CHORIONIC GONADOTROPIN ASSAY: CPT

## 2025-02-08 PROCEDURE — 36415 COLL VENOUS BLD VENIPUNCTURE: CPT

## 2025-02-08 PROCEDURE — 74176 CT ABD & PELVIS W/O CONTRAST: CPT

## 2025-02-08 PROCEDURE — 96375 TX/PRO/DX INJ NEW DRUG ADDON: CPT

## 2025-02-08 PROCEDURE — 81001 URINALYSIS AUTO W/SCOPE: CPT

## 2025-02-08 PROCEDURE — 99284 EMERGENCY DEPT VISIT MOD MDM: CPT

## 2025-02-08 PROCEDURE — 6360000002 HC RX W HCPCS: Performed by: EMERGENCY MEDICINE

## 2025-02-08 RX ORDER — ONDANSETRON 4 MG/1
4 TABLET, FILM COATED ORAL EVERY 8 HOURS PRN
Qty: 20 TABLET | Refills: 0 | Status: SHIPPED | OUTPATIENT
Start: 2025-02-08

## 2025-02-08 RX ORDER — ONDANSETRON 2 MG/ML
4 INJECTION INTRAMUSCULAR; INTRAVENOUS ONCE
Status: COMPLETED | OUTPATIENT
Start: 2025-02-08 | End: 2025-02-08

## 2025-02-08 RX ORDER — KETOROLAC TROMETHAMINE 15 MG/ML
15 INJECTION, SOLUTION INTRAMUSCULAR; INTRAVENOUS ONCE
Status: COMPLETED | OUTPATIENT
Start: 2025-02-08 | End: 2025-02-08

## 2025-02-08 RX ORDER — TAMSULOSIN HYDROCHLORIDE 0.4 MG/1
0.4 CAPSULE ORAL DAILY
Qty: 7 CAPSULE | Refills: 0 | Status: SHIPPED | OUTPATIENT
Start: 2025-02-08 | End: 2025-02-15

## 2025-02-08 RX ORDER — NAPROXEN 500 MG/1
500 TABLET ORAL 2 TIMES DAILY WITH MEALS
Qty: 60 TABLET | Refills: 5 | Status: SHIPPED | OUTPATIENT
Start: 2025-02-08

## 2025-02-08 RX ADMIN — KETOROLAC TROMETHAMINE 15 MG: 15 INJECTION, SOLUTION INTRAMUSCULAR; INTRAVENOUS at 22:38

## 2025-02-08 RX ADMIN — ONDANSETRON 4 MG: 2 INJECTION, SOLUTION INTRAMUSCULAR; INTRAVENOUS at 22:37

## 2025-02-08 ASSESSMENT — PAIN SCALES - GENERAL
PAINLEVEL_OUTOF10: 8
PAINLEVEL_OUTOF10: 8
PAINLEVEL_OUTOF10: 5

## 2025-02-08 ASSESSMENT — LIFESTYLE VARIABLES
HOW OFTEN DO YOU HAVE A DRINK CONTAINING ALCOHOL: NEVER
HOW MANY STANDARD DRINKS CONTAINING ALCOHOL DO YOU HAVE ON A TYPICAL DAY: PATIENT DOES NOT DRINK

## 2025-02-08 ASSESSMENT — PAIN DESCRIPTION - ORIENTATION
ORIENTATION: LEFT
ORIENTATION: LEFT

## 2025-02-08 ASSESSMENT — PAIN DESCRIPTION - LOCATION
LOCATION: FLANK
LOCATION: FLANK

## 2025-02-08 ASSESSMENT — PAIN - FUNCTIONAL ASSESSMENT: PAIN_FUNCTIONAL_ASSESSMENT: 0-10

## 2025-02-08 ASSESSMENT — PAIN DESCRIPTION - PAIN TYPE: TYPE: ACUTE PAIN

## 2025-02-09 NOTE — ED TRIAGE NOTES
Pt presents with L flank pain starting yesterday, pain 8/10. Pt reports nausea, denies emesis. States difficulty starting urination. Pt reports hx of bilateral kidney stones and kidney stents.

## 2025-02-10 NOTE — ED PROVIDER NOTES
outpatient management.  No indication for admission at this time.     Symptomatic treatment with expectant management discussed with the patient and/or family member or surrogates present and they are amenable to treatment plan and outpatient follow-up.  Strict return precautions were discussed with the patient and those present.  All parties involved were informed that condition may persist or worsen in which case they may then require inpatient treatment, currently there is no indication.  They demonstrated understanding of when to return to the emergency department for new or worsening symptoms.      I am the Primary Clinician of Record.    FINAL IMPRESSION      1. Kidney stone          DISPOSITION/PLAN     DISPOSITION Decision To Discharge 02/08/2025 11:15:52 PM   DISPOSITION CONDITION Stable           PATIENT REFERRED TO:  [facility]  [id number]  Schedule an appointment as soon as possible for a visit         DISCHARGE MEDICATIONS:  Discharge Medication List as of 2/8/2025 11:17 PM        START taking these medications    Details   naproxen (NAPROSYN) 500 MG tablet Take 1 tablet by mouth 2 times daily (with meals), Disp-60 tablet, R-5Normal      ondansetron (ZOFRAN) 4 MG tablet Take 1 tablet by mouth every 8 hours as needed for Nausea, Disp-20 tablet, R-0Normal             DISCONTINUED MEDICATIONS:  Discharge Medication List as of 2/8/2025 11:17 PM                 (Please note that portions of this note were completed with a voice recognition program.  Efforts were made to edit the dictations but occasionally words are mis-transcribed.)    Benedict Cottrell MD (electronically signed)            Benedict Cottrell MD  02/10/25 9407

## (undated) DEVICE — SOLUTION IRRIG 3000ML STRL H2O USP UROMATIC PLAS CONT

## (undated) DEVICE — OPEN-END FLEXI-TIP URETERAL CATHETER: Brand: FLEXI-TIP

## (undated) DEVICE — SOLUTION IRRIG 1000ML STRL H2O USP PLAS POUR BTL

## (undated) DEVICE — SYRINGE MED 10ML LUERLOCK TIP W/O SFTY DISP

## (undated) DEVICE — GLOVE SURG SZ 65 CRM LTX FREE POLYISOPRENE POLYMER BEAD ANTI

## (undated) DEVICE — CYSTOSCOPY: Brand: MEDLINE INDUSTRIES, INC.

## (undated) DEVICE — MERCY HEALTH WEST TURNOVER: Brand: MEDLINE INDUSTRIES, INC.

## (undated) DEVICE — GUIDEWIRE URO L150CM DIA0.035IN TAPR 3CM NIT HYDRPHLC ANG

## (undated) DEVICE — GUIDEWIRE URO L150CM DIA0.035IN TAPR 8CM STR TIP STD SHFT

## (undated) DEVICE — GOWN SIRUS NONREIN XL W/TWL: Brand: MEDLINE INDUSTRIES, INC.